# Patient Record
Sex: FEMALE
[De-identification: names, ages, dates, MRNs, and addresses within clinical notes are randomized per-mention and may not be internally consistent; named-entity substitution may affect disease eponyms.]

---

## 2013-12-01 VITALS — DIASTOLIC BLOOD PRESSURE: 89 MMHG | SYSTOLIC BLOOD PRESSURE: 138 MMHG

## 2018-06-03 ENCOUNTER — HOSPITAL ENCOUNTER (EMERGENCY)
Dept: HOSPITAL 7 - FB.ED | Age: 33
Discharge: HOME | End: 2018-06-03
Payer: COMMERCIAL

## 2018-06-03 VITALS — DIASTOLIC BLOOD PRESSURE: 68 MMHG | SYSTOLIC BLOOD PRESSURE: 111 MMHG

## 2018-06-03 DIAGNOSIS — R10.30: Primary | ICD-10-CM

## 2018-06-03 DIAGNOSIS — Z90.49: ICD-10-CM

## 2018-06-03 DIAGNOSIS — E11.9: ICD-10-CM

## 2018-06-04 NOTE — ER
DATE SEEN:  2018

 

REASON FOR VISIT:  Abdominal pain.

 

HISTORY OF PRESENT ILLNESS:  This is a 32-year-old female complaining of

abdominal pain.  Her pain is moderate, started tonight in the lower abdominal

quadrants.  There is no association with any vaginal bleeding, urinary symptoms,

or GI symptoms.

 

PAST SURGICAL HISTORY:

1. Appendectomy.

2. Gallbladder surgery.

3.  x2.

 

ALLERGIES:  Reviewed.

 

MEDICATIONS:  Reviewed.

 

PAST MEDICAL HISTORY:  Type 2 diabetes.

 

PHYSICAL EXAMINATION:  VITAL SIGNS:  Blood pressure is normal, pulse is 87, and

she is afebrile.  ABDOMEN:  Soft with tenderness in the lower quadrants, but no

rebound and no masses.  CHEST AND HEART:  Normal to auscultation.  PSYCHIATRIC:

Mental status is alert.

 

LABORATORY DATA:  Normal electrolytes and UA.  Negative pregnancy test.  White

cell count of 16.4.

 

DIAGNOSTIC STUDIES:  Pelvic ultrasound was unremarkable.

 

IMPRESSION:  Nonspecific abdominal pain.

 

PLAN:  Morphine 10 mg IM x1 dose.  Symptoms improved.  The patient was

discharged home.

 

Job#: [id number]

DD: [id number]

DT: 2018 0326 TN/STEPHAN

## 2018-06-19 NOTE — US
INDICATION:  Left lower pelvic pain.



PELVIC ULTRASOUND, NON-OB, LIMITED:  Multiple ultrasonic images were obtained 
with transabdominal probe, 06/03/2018, and revealed poor visualization of the 
uterus and adnexa due to lack of complete filling of the urinary bladder.  
Endovaginal probe ultrasound will be necessary for further evaluation, 
therefore.  



No gross abnormalities were seen - no definite adnexal mass lesion or free 
fluid collection was identified.  



INDICATION:  Left lower pelvic pain/need better visualization of the uterus and 
endometrial cavity, as well as ovaries, than was possible with the 
transabdominal probe.  



TRANSVAGINAL PELVIC ULTRASOUND:  Utilizing transvaginal probe, multiple 
ultrasonic images were obtained and revealed the uterus to measure 7.2 x 3.3 x 
5 cm with a 6.0 mm endometrial cavity measurement. 



The right ovary had a volume of 8.8 mL, measuring 3 x 3.3 x 1.7 cm.  



The left ovary volume was 2.6 mL with measurements of 1.4 x 2.2 x 1.6 cm.  



The endometrial cavity echo appeared essentially normal.  Likely on the basis 
of menstrual cycle, there is some relatively low echogenicity in the central 
portion.  Tiny nabothian cyst is suggested at the cervix.  



No mass lesions were identified in the myometrium or endometrium.  No free 
fluid collections or adnexal mass lesions were identified.  



There are multiple follicles at the right ovary, which was otherwise 
unremarkable.  The left ovary showed only very minimal follicles present.  This 
accounts for the difference in volume of the ovaries.



IMPRESSION:  Essentially normal pelvic ultrasound with transvaginal probe.  
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

MTDD no

## 2018-09-22 ENCOUNTER — HOSPITAL ENCOUNTER (EMERGENCY)
Dept: HOSPITAL 7 - FB.ED | Age: 33
Discharge: HOME | End: 2018-09-22
Payer: COMMERCIAL

## 2018-09-22 VITALS — DIASTOLIC BLOOD PRESSURE: 79 MMHG | SYSTOLIC BLOOD PRESSURE: 137 MMHG

## 2018-09-22 DIAGNOSIS — S29.012A: Primary | ICD-10-CM

## 2018-09-22 DIAGNOSIS — Z91.040: ICD-10-CM

## 2018-09-22 DIAGNOSIS — Z88.0: ICD-10-CM
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
## 2018-09-22 DIAGNOSIS — E11.9: ICD-10-CM

## 2018-09-22 PROCEDURE — 72072 X-RAY EXAM THORAC SPINE 3VWS: CPT

## 2018-09-22 PROCEDURE — 96372 THER/PROPH/DIAG INJ SC/IM: CPT

## 2018-09-22 PROCEDURE — 99283 EMERGENCY DEPT VISIT LOW MDM: CPT

## 2018-09-22 NOTE — EDM.PDOC
ED HPI GENERAL MEDICAL PROBLEM





- General


Chief Complaint: Back Pain or Injury


Stated Complaint: back pain


Time Seen by Provider: 18 07:57


Source of Information: Reports: Patient


History Limitations: Reports: No Limitations





- History of Present Illness


INITIAL COMMENTS - FREE TEXT/NARRATIVE: 





Felt a pop @mid back yesterday at work while lifting 5 lb tubes, developed mid 

back pain that radiates to hips and up back, not relieved by Ibuprofen or 

Tylenol.


Onset Date: 18


Onset Time: 00:00


Location: Reports: Back


Quality: Reports: Dull


Severity: Moderate


Worsens with: Reports: Movement


Treatments PTA: Reports: Acetaminophen, NSAIDS


  ** mid,lower back


Pain Score (Numeric/FACES): 10





- Related Data


 Allergies











Allergy/AdvReac Type Severity Reaction Status Date / Time


 


latex Allergy  Rash Verified 18 07:50


 


naproxen [From Naprosyn] Allergy  Nausea and Verified 18 07:50





   Vomiting  


 


Penicillins Allergy  Rash Verified 18 07:50











Home Meds: 


 Home Meds





Ibuprofen [Motrin] 600 mg PO Q6HR PRN 13 [History]


Topiramate [Topamax] 50 mg PO BID 14 [History]


Pregabalin [Lyrica] 150 mg PO BID 14 [History]


Lithium Carbonate [Eskalith] 300 mg PO BID 16 [History]


traZODone 50 mg PO DAILY 16 [History]


Acetaminophen/HYDROcodone [Norco 325-5 MG] 1 - 2 tab PO Q6H PRN #20 tab  [Rx]


Lurasidone HCl [Latuda] 1 tab PO BEDTIME 18 [History]


sitaGLIPtin Phos/Metformin HCl [Janumet 50-1,000 MG] 1 tab PO BID 18 [

History]











Past Medical History


HEENT History: Reports: Impaired Vision


Respiratory History: Reports: Bronchitis, Recurrent


Gastrointestinal History: Reports: Cholelithiasis


OB/GYN History: Reports: Pregnancy


Musculoskeletal History: Reports: Fibromyalgia


Psychiatric History: Reports: Anxiety, Depression, Panic Attack


Endocrine/Metabolic History: Reports: Diabetes, Type II





- Past Surgical History


GI Surgical History: Reports: Appendectomy, Cholecystectomy, Other (See Below)


Other GI Surgeries/Procedures: exp lap for ruptured cyst


Female  Surgical History: Reports:  Section





Social & Family History





- Family History


Family Medical History: Noncontributory





- Tobacco Use


Smoking Status *Q: Current Every Day Smoker


Tobacco Use Within Last Twelve Months: Cigarettes





- Caffeine Use


Caffeine Use: Reports: Coffee, Soda





- Living Situation & Occupation


Living situation: Reports: 


Occupation: Unemployed





ED ROS GENERAL





- Review of Systems


Review Of Systems: ROS reveals no pertinent complaints other than HPI.





ED EXAM, UPPER BACK/NECK PAIN





- Physical Exam


Exam: See Below


Exam Limited By: No Limitations


General Appearance: Alert, WD/WN, No Apparent Distress


Ears Exam: Normal External Exam


Nose Exam: Normal Inspection


Throat/Mouth Exam: No Airway Compromise


Head Exam: Atraumatic, Normocephalic


Neck Exam: Full Range of Motion


Cardiovascular/Respiratory: Regular Rate, Rhythm, No M/R/G


GI/Abdominal: Non-Tender


Back Exam: Muscle Spasm, Vertebral Tenderness (mid back)


Extremities: Normal Range of Motion


Neurologic: No Motor/Sensory Deficits, Normal Mood/Affect


Psychiatric: Normal Affect, Normal Mood


Skin Exam: Normal Color, Warm/Dry





Course





- Vital Signs


Last Recorded V/S: 


 Last Vital Signs











Temp  36.9 C   18 07:45


 


Pulse  90   18 07:45


 


Resp  17   18 07:45


 


BP  136/65   18 07:45


 


Pulse Ox  100   18 07:45














- Orders/Labs/Meds


Orders: 


 Active Orders 24 hr











 Category Date Time Status


 


 Thoracic Spine 3V [CR] Stat Exams  18 07:56 Ordered











Meds: 


Medications














Discontinued Medications














Generic Name Dose Route Start Last Admin





  Trade Name Maria Elena  PRN Reason Stop Dose Admin


 


Hydromorphone HCl  1 mg  18 07:56  18 08:38





  Dilaudid  IM  18 07:57  1 mg





  ONETIME ONE   Administration





     





     





     





     














- Radiology Interpretation


Free Text/Narrative:: 





Thoracic spine XR: NAD





- Re-Assessments/Exams


Free Text/Narrative Re-Assessment/Exam: 





18 08:53


Symptoms improved after Dilaudid 1mg IM





Departure





- Departure


Time of Disposition: 08:53


Disposition: Home, Self-Care 01


Condition: Good


Clinical Impression: 


Strain of mid-back


Qualifiers:


 Encounter type: initial encounter Qualified Code(s): S29.012A - Strain of 

muscle and tendon of back wall of thorax, initial encounter








- Discharge Information


*PRESCRIPTION DRUG MONITORING PROGRAM REVIEWED*: Yes


*COPY OF PRESCRIPTION DRUG MONITORING REPORT IN PATIENT BERNICE: Not Applicable


Prescriptions: 


Acetaminophen/HYDROcodone [Norco 325-5 MG] 1 - 2 tab PO Q6H PRN #20 tab


 PRN Reason: Pain


Instructions:  Back Injury Prevention, Easy-to-Read


Referrals: 


Dheeraj Brannon MD [Primary Care Provider] - 


Forms:  ED Department Discharge, ED Return to Work/School Form


Additional Instructions: 


Rest. Fill prescription for Norco and take as directed. Avoid lifting greater 

than 10 lbs x 1 week. Follow up with your primary physician in 2-3 days. Return 

to the ER as needed.





- My Orders


Last 24 Hours: 


My Active Orders





18 07:56


Thoracic Spine 3V [CR] Stat 














- Assessment/Plan


Last 24 Hours: 


My Active Orders





18 07:56


Thoracic Spine 3V [CR] Stat

## 2018-09-25 NOTE — CR
INDICATION:  Injury.



THORACIC SPINE:  Six images of the thoracic spine were obtained in frontal and 
lateral projections and revealed normal appearing bone density, vertebral body 
and disk heights to be well-maintained, pedicles to appear intact.  



Minimal degenerative hypertrophic change is noted anteriorly off mid thoracic 
vertebral bodies.  



If occult fracture site is suspected clinically, nuclear bone imaging, CT, or 
MRI may be helpful.



A very minimal dextroconvex scoliosis is suggested at the thoracolumbar spine.



IMPRESSION:  No acute fracture or dislocation identified.  

MTDD

## 2019-02-28 ENCOUNTER — HOSPITAL ENCOUNTER (EMERGENCY)
Dept: HOSPITAL 7 - FB.ED | Age: 34
LOS: 1 days | Discharge: HOME | End: 2019-03-01
Payer: COMMERCIAL

## 2019-02-28 DIAGNOSIS — F41.9: ICD-10-CM

## 2019-02-28 DIAGNOSIS — E11.40: ICD-10-CM

## 2019-02-28 DIAGNOSIS — M94.0: Primary | ICD-10-CM

## 2019-02-28 DIAGNOSIS — F31.9: ICD-10-CM

## 2019-02-28 DIAGNOSIS — Z91.040: ICD-10-CM

## 2019-02-28 DIAGNOSIS — Z79.899: ICD-10-CM

## 2019-02-28 DIAGNOSIS — Z79.4: ICD-10-CM

## 2019-02-28 DIAGNOSIS — F17.210: ICD-10-CM

## 2019-02-28 PROCEDURE — 99284 EMERGENCY DEPT VISIT MOD MDM: CPT

## 2019-02-28 PROCEDURE — 93005 ELECTROCARDIOGRAM TRACING: CPT

## 2019-02-28 PROCEDURE — 87880 STREP A ASSAY W/OPTIC: CPT

## 2019-02-28 PROCEDURE — 36415 COLL VENOUS BLD VENIPUNCTURE: CPT

## 2019-02-28 PROCEDURE — 87081 CULTURE SCREEN ONLY: CPT

## 2019-02-28 PROCEDURE — 85025 COMPLETE CBC W/AUTO DIFF WBC: CPT

## 2019-02-28 PROCEDURE — 86140 C-REACTIVE PROTEIN: CPT

## 2019-02-28 PROCEDURE — 96372 THER/PROPH/DIAG INJ SC/IM: CPT

## 2019-02-28 PROCEDURE — 87804 INFLUENZA ASSAY W/OPTIC: CPT

## 2019-02-28 PROCEDURE — 80053 COMPREHEN METABOLIC PANEL: CPT

## 2019-02-28 PROCEDURE — 84484 ASSAY OF TROPONIN QUANT: CPT

## 2019-03-01 VITALS — SYSTOLIC BLOOD PRESSURE: 99 MMHG | DIASTOLIC BLOOD PRESSURE: 43 MMHG

## 2019-03-01 NOTE — EDM.PDOC
ED HPI GENERAL MEDICAL PROBLEM





- General


Chief Complaint: Chest Pain


Stated Complaint: CHEST PAIN SOB


Time Seen by Provider: 19 23:30


Source of Information: Reports: Patient


History Limitations: Reports: No Limitations





- History of Present Illness


INITIAL COMMENTS - FREE TEXT/NARRATIVE: 





c/o cp x 1.5d





lives with  and 3 children who are not ill now, 1 child with strep 1w ago





had cough x 1w, nonproductive, no fever, not had flu vax





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





has had pain in upper mid chest to pressure, ibuprofen once earlier today helped

, not taken other meds





has DM





smokes 1 ppd





no previous heart or lung problems





constant, nothing helps except ibuprofen, nothing makes it worse





needs a note for work


Treatments PTA: Reports: NSAIDS


  ** Midsternal chest radiating into upper back


Pain Score (Numeric/FACES): 7





- Related Data


 Allergies











Allergy/AdvReac Type Severity Reaction Status Date / Time


 


latex Allergy  Rash Verified 19 23:24


 


naproxen [From Naprosyn] Allergy  Nausea and Verified 19 23:24





   Vomiting  


 


Penicillins Allergy  Rash Verified 19 23:24











Home Meds: 


 Home Meds





Ibuprofen [Motrin] 600 mg PO Q6HR PRN 13 [History]


Topiramate [Topamax] 50 mg PO BID 14 [History]


Pregabalin [Lyrica] 300 mg PO BID 14 [History]


Lithium Carbonate [Eskalith] 900 mg PO DAILY 16 [History]


traZODone 150 mg PO DAILY 16 [History]


Lurasidone HCl [Latuda] 120 mg PO BEDTIME 18 [History]


sitaGLIPtin Phos/Metformin HCl [Janumet 50-1,000 MG] 1 tab PO BID 18 [

History]


Eszopiclone 3 mg BEDTIME 19 [History]


Insulin Aspart [NovoLOG] 10 units TID 19 [History]


Insulin Degludec [Tresiba Flextouch U-100] 50 unit SQ DAILY 19 [History]


Lithium Carbonate 600 mg BEDTIME 19 [History]











Past Medical History


HEENT History: Reports: Impaired Vision


Other HEENT History: no dentures


Respiratory History: Reports: Bronchitis, Recurrent


Gastrointestinal History: Reports: Cholelithiasis


Genitourinary History: Reports: None


OB/GYN History: Reports: Polycystic Ovaries, Pregnancy


Other OB/GYN History: 


Musculoskeletal History: Reports: Fibromyalgia


Neurological History: Reports: Migraines, Neuropathy, Diabetic


Psychiatric History: Reports: Anxiety, Bipolar, Depression, Panic Attack, Psych 

Hospitalization(s), Suicide Attempt


Endocrine/Metabolic History: Reports: Diabetes, Type II, Obesity/BMI 30+





- Past Surgical History


HEENT Surgical History: Reports: Adenoidectomy, Oral Surgery, Tonsillectomy


GI Surgical History: Reports: Appendectomy, Cholecystectomy, Other (See Below)


Other GI Surgeries/Procedures: exp lap for ruptured cyst


Female  Surgical History: Reports:  Section


Other Female  Surgeries/Procedures: CS x 1


Neurological Surgical History: Reports: None


Musculoskeletal Surgical History: Reports: None





Social & Family History





- Family History


Family Medical History: Noncontributory





- Tobacco Use


Smoking Status *Q: Current Every Day Smoker


Years of Tobacco use: 20


Packs/Tins Daily: 0.5





- Caffeine Use


Caffeine Use: Reports: Coffee, Energy Drinks, Soda





- Recreational Drug Use


Recreational Drug Use: No





- Living Situation & Occupation


Living situation: Reports: 


Occupation: Unemployed





ED ROS GENERAL





- Review of Systems


Review Of Systems: See Below


Constitutional: Reports: No Symptoms


HEENT: Reports: No Symptoms


Respiratory: Reports: No Symptoms


Cardiovascular: Reports: Chest Pain


Endocrine: Reports: No Symptoms


GI/Abdominal: Reports: No Symptoms


: Reports: No Symptoms


Musculoskeletal: Reports: No Symptoms


Skin: Reports: No Symptoms


Neurological: Reports: No Symptoms


Psychiatric: Reports: No Symptoms


Hematologic/Lymphatic: Reports: No Symptoms


Immunologic: Reports: No Symptoms





ED EXAM, GENERAL





- Physical Exam


Exam: See Below


Exam Limited By: No Limitations


General Appearance: Alert, WD/WN, No Apparent Distress


Nose: Normal Inspection, Normal Mucosa, No Blood


Throat/Mouth: Normal Inspection, Normal Lips, Normal Teeth, Normal Gums, Normal 

Oropharynx, Normal Voice, No Airway Compromise


Head: Atraumatic, Normocephalic


Neck: Normal Inspection, Supple, Non-Tender, Full Range of Motion.  No: 

Lymphadenopathy (R), Lymphadenopathy (L)


Respiratory/Chest: No Respiratory Distress, Lungs Clear, Normal Breath Sounds, 

No Accessory Muscle Use, Other (1-2+ tender to palaption of upper sternum and 

adjacent ribs, lungs clear in all fields)


Cardiovascular: Regular Rate, Rhythm, No Edema, No Gallop, No Murmur, No Rub


GI/Abdominal: Soft, Non-Tender, No Distention


Back Exam: Normal Inspection, Full Range of Motion, NT


Extremities: Normal Inspection, Normal Range of Motion, Non-Tender, No Pedal 

Edema


Neurological: Alert, Oriented, CN II-XII Intact, Normal Cognition, No Motor/

Sensory Deficits


Psychiatric: Normal Affect, Normal Mood


Skin Exam: Warm, Dry, Intact, Normal Color, No Rash


Lymphatic: No Adenopathy





Course





- Vital Signs


Last Recorded V/S: 


 Last Vital Signs











Temp  36.9 C   19 23:24


 


Pulse  90   19 23:24


 


Resp  18   19 23:24


 


BP  118/62   19 23:24


 


Pulse Ox  98   19 23:24














- Orders/Labs/Meds


Orders: 


 Active Orders 24 hr











 Category Date Time Status


 


 EKG Documentation Completion [RC] ASDIRECTED Care  19 23:42 Active


 


 CULTURE STREP A CONFIRMATION [RM] Stat Lab  19 00:10 Results


 


 STREP SCRN A RAPID W CULT CONF [RM] Stat Lab  19 23:59 Ordered


 


 EKG 12 Lead [EK] Routine Ther  19 23:42 Ordered











Labs: 


 Laboratory Tests











  19 Range/Units





  00:20 00:20 00:20 


 


WBC  16.4 H    (4.5-12.0)  X10-3/uL


 


RBC  4.92    (3.23-5.20)  x10(6)uL


 


Hgb  14.3    (11.5-15.5)  g/dL


 


Hct  42.6    (30.0-51.3)  %


 


MCV  86.7    (80-96)  fL


 


MCH  29.0    (27.7-33.6)  pg


 


MCHC  33.5    (32.2-35.4)  g/dL


 


RDW  13.2    (11.5-15.5)  %


 


Plt Count  228    (125-369)  X10(3)uL


 


MPV  9.3    (7.4-10.4)  fL


 


Neut % (Auto)  67.0    (46-82)  %


 


Lymph % (Auto)  21.4    (13-37)  %


 


Mono % (Auto)  6.0    (4-12)  %


 


Eos % (Auto)  5    (1.0-5.0)  %


 


Baso % (Auto)  0    (0-2)  %


 


Neut # (Auto)  10.9 H    (1.6-8.3)  #


 


Lymph # (Auto)  3.5    (0.6-5.0)  #


 


Mono # (Auto)  1.0    (0.0-1.3)  #


 


Eos # (Auto)  0.9 H    (0.0-0.8)  #


 


Baso # (Auto)  0.1    (0.0-0.2)  #


 


Sodium   141   (135-145)  mmol/L


 


Potassium   3.5   (3.5-5.3)  mmol/L


 


Chloride   103   (100-110)  mmol/L


 


Carbon Dioxide   27   (21-32)  mmol/L


 


BUN   10   (7-18)  mg/dL


 


Creatinine   0.8   (0.55-1.02)  mg/dL


 


Est Cr Clr Drug Dosing   99.95   mL/min


 


Estimated GFR (MDRD)   > 60   (>60)  


 


BUN/Creatinine Ratio   12.5   (9-20)  


 


Glucose   158 H   ()  mg/dL


 


Calcium   9.5   (8.6-10.2)  mg/dL


 


Total Bilirubin   0.4   (0.1-1.3)  mg/dL


 


AST   22   (5-25)  IU/L


 


ALT   32  D   (12-36)  U/L


 


Alkaline Phosphatase   130 H   ()  IU/L


 


Troponin I    < 0.017 L  (<0.017-0.056)  ng/mL


 


C-Reactive Protein    1.8 H  (0.5-0.9)  mg/dL


 


Total Protein   7.2   (6.0-8.0)  g/dL


 


Albumin   3.4 L   (3.5-5.2)  g/dL


 


Globulin   3.8   g/dL


 


Albumin/Globulin Ratio   0.9   











Meds: 


Medications














Discontinued Medications














Generic Name Dose Route Start Last Admin





  Trade Name Freq  PRN Reason Stop Dose Admin


 


Ketorolac Tromethamine  60 mg  19 23:58  19 00:10





  Toradol  IM  19 23:59  60 mg





  ONETIME ONE   Administration





     





     





     





     














- Re-Assessments/Exams


Free Text/Narrative Re-Assessment/Exam: 





19 01:07


inc'd CRP 1.8 c/w viral bronchitis as cause of cough





strep and flu are neg





feels better after Toradol IM, agrees to use ibuprofen and APAP at home





Departure





- Departure


Time of Disposition: 01:08


Disposition: Home, Self-Care 01


Condition: Good


Clinical Impression: 


 Costochondritis





Instructions:  Costochondritis


Referrals: 


Dheeraj Brannon MD [Primary Care Provider] - 


Forms:  ED Department Discharge, ED Return to Work/School Form


Additional Instructions: 


For pain and inflammation, take acetaminophen 500 mg 2 tabs and ibuprofen 200 

mg 4 tabs 3 times a day for 5 days, longer if needed.





Use heat to chest for 10 minutes 4 times a day.





No work.





See your doctor in 3 days for further recommendations.





Return to ED if feeling worse.





- My Orders


Last 24 Hours: 


My Active Orders





19 23:42


EKG Documentation Completion [RC] ASDIRECTED 


EKG 12 Lead [EK] Routine 





19 23:59


STREP SCRN A RAPID W CULT CONF [RM] Stat 





19 00:10


CULTURE STREP A CONFIRMATION [RM] Stat 














- Assessment/Plan


Last 24 Hours: 


My Active Orders





19 23:42


EKG Documentation Completion [RC] ASDIRECTED 


EKG 12 Lead [EK] Routine 





19 23:59


STREP SCRN A RAPID W CULT CONF [RM] Stat 





19 00:10


CULTURE STREP A CONFIRMATION [RM] Stat

## 2019-03-23 ENCOUNTER — HOSPITAL ENCOUNTER (EMERGENCY)
Dept: HOSPITAL 7 - FB.ED | Age: 34
LOS: 1 days | Discharge: HOME | End: 2019-03-24
Payer: COMMERCIAL

## 2019-03-23 VITALS — DIASTOLIC BLOOD PRESSURE: 75 MMHG | SYSTOLIC BLOOD PRESSURE: 123 MMHG

## 2019-03-23 DIAGNOSIS — M23.92: Primary | ICD-10-CM

## 2019-03-23 DIAGNOSIS — Z79.899: ICD-10-CM

## 2019-03-23 DIAGNOSIS — Z91.040: ICD-10-CM

## 2019-03-23 DIAGNOSIS — E11.9: ICD-10-CM

## 2019-03-23 DIAGNOSIS — Z79.4: ICD-10-CM

## 2019-03-23 DIAGNOSIS — Z88.0: ICD-10-CM

## 2019-03-23 DIAGNOSIS — Z88.5: ICD-10-CM

## 2019-03-23 PROCEDURE — 99283 EMERGENCY DEPT VISIT LOW MDM: CPT

## 2019-03-23 PROCEDURE — 96372 THER/PROPH/DIAG INJ SC/IM: CPT

## 2019-03-23 NOTE — ER
DATE SEEN:  03/23/2019

 

CHIEF COMPLAINT:  Left knee pain.

 

HISTORY OF PRESENT ILLNESS:  This is a 33-year-old female with left knee pain

for a few hours.  She left work because the pain on the left knee became

unbearable.  There was no specific injury and the pain started before she went

to work.  She had trouble with both knees before, especially on the right one.

She also has a history of back pain, type 2 diabetes and fibromyalgia.  She has

tried Tylenol at home with no relief.

 

REVIEW OF SYSTEMS:  No fever or chills.  She complains some swelling and the

knee pain is most localized in the back.  Denies any weakness.

 

ALLERGIES:  Please see the electronic record.

 

PHYSICAL EXAMINATION:  VITAL SIGNS:  Her blood pressure and temperature within

normal limits.  EXTREMITIES:  Left knee, no obvious effusion noted.  No trauma.

There is tenderness anteriorly and some in the popliteal fossa.  Range of motion

is unlimited.  Gait and station are normal.

 

IMPRESSION:  Knee pain, acute internal derangement.

 

PLAN:  Morphine 10 mg IM and Vistaril 50 mg IM.  I recommended rest, ice and

elevation and I asked her to return to work, but not to bear weight on it and

she should follow up on Monday in the clinic.

 

TIME OF DISCHARGE:  1130.

 

Job#: [id number]

DD: 03/23/2019 [id number]

DT: 03/23/2019 2342 TN/STEPHAN

## 2019-10-12 ENCOUNTER — HOSPITAL ENCOUNTER (INPATIENT)
Dept: HOSPITAL 7 - FB.MS | Age: 34
LOS: 5 days | Discharge: HOME | DRG: 872 | End: 2019-10-17
Attending: FAMILY MEDICINE | Admitting: FAMILY MEDICINE
Payer: COMMERCIAL

## 2019-10-12 DIAGNOSIS — L02.31: ICD-10-CM

## 2019-10-12 DIAGNOSIS — F41.0: ICD-10-CM

## 2019-10-12 DIAGNOSIS — E11.42: ICD-10-CM

## 2019-10-12 DIAGNOSIS — F31.9: ICD-10-CM

## 2019-10-12 DIAGNOSIS — Z90.89: ICD-10-CM

## 2019-10-12 DIAGNOSIS — Z90.49: ICD-10-CM

## 2019-10-12 DIAGNOSIS — Z79.4: ICD-10-CM

## 2019-10-12 DIAGNOSIS — E87.6: ICD-10-CM

## 2019-10-12 DIAGNOSIS — B95.61: ICD-10-CM

## 2019-10-12 DIAGNOSIS — K61.1: ICD-10-CM

## 2019-10-12 DIAGNOSIS — G43.909: ICD-10-CM

## 2019-10-12 DIAGNOSIS — Z91.040: ICD-10-CM

## 2019-10-12 DIAGNOSIS — Z88.0: ICD-10-CM

## 2019-10-12 DIAGNOSIS — M79.7: ICD-10-CM

## 2019-10-12 DIAGNOSIS — Z88.8: ICD-10-CM

## 2019-10-12 DIAGNOSIS — L03.317: ICD-10-CM

## 2019-10-12 DIAGNOSIS — A41.9: Primary | ICD-10-CM

## 2019-10-12 DIAGNOSIS — Z79.899: ICD-10-CM

## 2019-10-12 DIAGNOSIS — E66.9: ICD-10-CM

## 2019-10-12 PROCEDURE — A9579 GAD-BASE MR CONTRAST NOS,1ML: HCPCS

## 2019-10-12 PROCEDURE — G0378 HOSPITAL OBSERVATION PER HR: HCPCS

## 2019-10-12 RX ADMIN — KETOROLAC TROMETHAMINE PRN MG: 30 INJECTION, SOLUTION INTRAMUSCULAR at 22:26

## 2019-10-12 RX ADMIN — Medication PRN ML: at 18:22

## 2019-10-12 RX ADMIN — METRONIDAZOLE SCH MLS/HR: 500 SOLUTION INTRAVENOUS at 19:18

## 2019-10-12 RX ADMIN — Medication PRN ML: at 22:28

## 2019-10-12 RX ADMIN — METRONIDAZOLE SCH: 500 SOLUTION INTRAVENOUS at 22:16

## 2019-10-12 RX ADMIN — Medication PRN ML: at 22:27

## 2019-10-12 NOTE — PCM.HP.2
H&P History of Present Illness





- General


Date of Service: 10/12/19


Admit Problem/Dx: 


 Admission Diagnosis/Problem





Admission Diagnosis/Problem      Cellulitis of buttock








Source of Information: Patient, Family


History Limitations: Reports: No Limitations





- History of Present Illness


Initial Comments - Free Text/Narative: 


Patient noted pain with sitting and green foul drainage from left buttock 

starting on Thursday, she refused to come to the doctor on Thursday when her 

 advised her to. She has been having fevers, chills, nausea, vomiting, 

but no diarrhea. Last emesis was last night. She has had increased frequency 

but no dysuria or hematuria. She has not done any sitz baths or applied any 

topical antibiotics. Rates pain 10/10. Was seen in Walk-In clinic today and 

when provider examined, called me for admission. She was afebrile in the clinic 

but tachycardiac. When she arrived to floor she was febrile, 101. She has been 

having cold symptoms for the past week but has not taken any treatments. She is 

Diabetic that has Tresiba, Novolog and Janumet but has not been taking her 

medications. She knows that her uncontrolled sugars put her at risk of 

infections. She also report being achy all over. No dizziness or 

lightheadedness. Also was treated for fungal infection on her feet with oral 

and topical medication(not sure of the names), oral for 4 weeks but did not 

change the rash. 





- Related Data


Allergies/Adverse Reactions: 


 Allergies











Allergy/AdvReac Type Severity Reaction Status Date / Time


 


latex Allergy  Rash Verified 19 23:49


 


naproxen [From Naprosyn] Allergy  Nausea and Verified 19 23:49





   Vomiting  


 


Penicillins Allergy  Rash Verified 19 23:49











Home Medications: 


 Home Meds





Ibuprofen [Motrin] 600 mg PO Q6HR PRN 13 [History]


Topiramate [Topamax] 50 mg PO BID 14 [History]


Pregabalin [Lyrica] 300 mg PO BID 14 [History]


Lithium Carbonate [Eskalith] 900 mg PO DAILY 16 [History]


traZODone 150 mg PO DAILY 16 [History]


Lurasidone HCl [Latuda] 120 mg PO BEDTIME 18 [History]


sitaGLIPtin Phos/Metformin HCl [Janumet 50-1,000 MG] 1 tab PO BID 18 [

History]


Eszopiclone 3 mg BEDTIME 19 [History]


Insulin Aspart [NovoLOG] 10 units TID 19 [History]


Insulin Degludec [Tresiba Flextouch U-100] 50 unit SQ DAILY 19 [History]


Lithium Carbonate 600 mg BEDTIME 19 [History]











Past Medical History


HEENT History: Reports: Impaired Vision


Other HEENT History: no dentures


Respiratory History: Reports: Bronchitis, Recurrent


Gastrointestinal History: Reports: Cholelithiasis


Genitourinary History: Reports: None


OB/GYN History: Reports: Polycystic Ovaries, Pregnancy


Other OB/BYN History: 


Musculoskeletal History: Reports: Fibromyalgia


Neurological History: Reports: Migraines, Neuropathy, Diabetic


Psychiatric History: Reports: Anxiety, Bipolar, Depression, Panic Attack, Psych 

Hospitalization(s), Suicide Attempt


Endocrine/Metabolic History: Reports: Diabetes, Type II, Obesity/BMI 30+





- Past Surgical History


HEENT Surgical History: Reports: Adenoidectomy, Oral Surgery, Tonsillectomy


GI Surgical History: Reports: Appendectomy, Cholecystectomy, Other (See Below)


Other GI Surgeries/Procedures: exp lap for ruptured cyst


Female  Surgical History: Reports:  Section


Other Female  Surgeries/Procedures: CS x 1


Neurological Surgical History: Reports: None


Musculoskeletal Surgical History: Reports: None





Social & Family History





- Family History


Family Medical History: Noncontributory





- Caffeine Use


Caffeine Use: Reports: Coffee, Soda





- Living Situation & Occupation


Living situation: Reports: 


Occupation: Unemployed





H&P Review of Systems





- Review of Systems:


Review Of Systems: See Below


General: Reports: Fever, Chills, Weakness, Fatigue


HEENT: Reports: Rhinitis, Sinus Congestion.  Denies: Ear Pain, Eye Pain, 

Headaches, Sore Throat


Pulmonary: Reports: Cough.  Denies: Shortness of Breath


Cardiovascular: Reports: No Symptoms


Gastrointestinal: Reports: Abdominal Pain, Nausea, Vomiting.  Denies: 

Constipation, Diarrhea


Genitourinary: Reports: Frequency.  Denies: Dysuria, Hematuria


Musculoskeletal: Reports: Muscle Pain


Skin: Reports: Rash, Wound


Neurological: Reports: No Symptoms


Hematologic/Lymphatic: Reports: No Symptoms





Exam





- Exam


Exam: See Below





- Exam


General: Alert, Oriented, Cooperative


HEENT: PERRLA, Hearing Intact, Mucosa Moist & Pink, Nares Patent, Normal Nasal 

Septum, Posterior Pharynx Clear, Conjunctiva Clear, EOMI, EACs Clear, TMs Clear


Neck: Supple, Trachea Midline.  No: Lymphadenopathy


Lungs: Clear to Auscultation, Normal Respiratory Effort


Cardiovascular: Regular Rate, Tachycardia


GI/Abdominal Exam: Normal Bowel Sounds, Soft, No Distention, Guarding, Tender (

BLQ).  No: Rigid, Rebound


 (Female) Exam: Normal External Exam, Normal Speculum Exam, Normal Bimanual 

Exam


Rectal (Female) Exam: Other (Foul green drainage(culture collected), indurated, 

tender erythematous warm area 7 cm, surrounding erythema 18 cm, edges marked 

with skin marker)


Extremities: No Pedal Edema


Skin: Rash (left foot, yellow, fissured patches on ball, instep, and lateral 

side of her foot.)


Neuro Extensive - Mental Status: Alert, Oriented x3





- Problem List


(1) Cellulitis of left buttock


SNOMED Code(s): 27893203


   ICD Code: L03.317 - CELLULITIS OF BUTTOCK   Status: Acute   Current Visit: 

Yes   





(2) Nausea & vomiting


SNOMED Code(s): 13888956


   ICD Code: R11.2 - NAUSEA WITH VOMITING, UNSPECIFIED   Status: Acute   

Current Visit: Yes   





(3) Fever


SNOMED Code(s): 224511816


   ICD Code: R50.9 - FEVER, UNSPECIFIED   Status: Acute   Current Visit: Yes   





(4) Fibromyalgia


SNOMED Code(s): 790658902


   ICD Code: M79.7 - FIBROMYALGIA   Status: Acute   Current Visit: Yes   





(5) Diabetes mellitus with insulin therapy


SNOMED Code(s): 359185635


   ICD Code: E11.9 - TYPE 2 DIABETES MELLITUS WITHOUT COMPLICATIONS; Z79.4 - 

LONG TERM (CURRENT) USE OF INSULIN   Status: Acute   Current Visit: Yes   


Problem List Initiated/Reviewed/Updated: Yes


Orders Last 24hrs: 


 Active Orders 24 hr











 Category Date Time Status


 


 Patient Status [ADT] Routine ADT  10/12/19 16:32 Ordered


 


 Ambulate [RC] PER UNIT ROUTINE Care  10/12/19 16:34 Ordered


 


 Antiembolic Devices [RC] .Routine Care  10/12/19 16:32 Ordered


 


 Blood Glucose Check, Bedside [RC] WITHMEALSANDBED Care  10/12/19 16:32 Ordered


 


 May Shower [RC] ASDIRECTED Care  10/12/19 16:32 Ordered


 


 Oxygen Therapy [RC] PRN Care  10/12/19 16:32 Ordered


 


 Peripheral IV Care [RC] .AS DIRECTED Care  10/12/19 16:32 Ordered


 


 Up ad Anali [RC] ASDIRECTED Care  10/12/19 16:32 Ordered


 


 VTE/DVT Education [RC] Per Unit Routine Care  10/12/19 16:32 Ordered


 


 Vital Signs [RC] Q4H Care  10/12/19 16:32 Ordered


 


 Consistent Carbohydrate Diet [DIET] Diet  10/12/19 Dinner Ordered


 


 CBC WITH AUTO DIFF [HEME] Urgent Lab  10/12/19 16:32 Ordered


 


 COMPREHENSIVE METABOLIC PN,CMP [CHEM] Urgent Lab  10/12/19 16:32 Ordered


 


 CRP [C-REACTIVE PROTEIN] [CHEM] Urgent Lab  10/12/19 16:44 Ordered


 


 CULTURE BLOOD [BC] Urgent Lab  10/12/19 16:40 Ordered


 


 CULTURE BLOOD [BC] Urgent Lab  10/12/19 16:40 Ordered


 


 CULTURE ROUTINE + SMEAR [RM] Routine Lab  10/12/19 16:32 Ordered


 


 UA W/MICROSCOPIC [URIN] Routine Lab  10/12/19 16:32 Ordered


 


 Acetaminophen [Tylenol] Med  10/12/19 16:32 Ordered





 650 mg PO Q4H PRN   


 


 Enoxaparin [Lovenox] Med  10/12/19 16:45 Ordered





 40 mg SUBCUT Q24H   


 


 HYDROmorphone [Dilaudid] Med  10/12/19 16:32 Ordered





 0.25 mg IVPUSH Q2H PRN   


 


 Ketorolac [Toradol] Med  10/12/19 16:32 Ordered





 30 mg IVPUSH Q6H PRN   


 


 Nicotine [Habitrol] Med  10/12/19 16:45 Ordered





 7 mg TRDERM DAILY   


 


 Ondansetron [Zofran ODT] Med  10/12/19 16:32 Ordered





 4 mg PO Q4H PRN   


 


 Promethazine [Phenergan] 12.5 mg Med  10/12/19 16:32 Ordered





 Sodium Chloride 0.9% [Normal Saline] 50 ml   





 IV Q6H   


 


 Sodium Chloride 0.9% @ 125 MLS/HR (1000ml) Med  10/12/19 16:45 Ordered





 Sodium Chloride 0.9% [Normal Saline] 1,000 ml   





 IV ASDIRECTED   


 


 Sodium Chloride 0.9% [Saline Flush] Med  10/12/19 16:32 Ordered





 10 ml FLUSH ASDIRECTED PRN   


 


 Antiembolic Hose [OM.PC] Per Unit Routine Oth  10/12/19 16:34 Ordered


 


 Blood Culture x2 Reflex Set [OM.PC] Urgent Oth  10/12/19 16:32 Ordered


 


 Peripheral IV Insertion Adult [OM.PC] Routine Oth  10/12/19 16:32 Ordered


 


 Resuscitation Status Routine Resus Stat  10/12/19 16:32 Ordered








 Medication Orders





Acetaminophen (Tylenol)  650 mg PO Q4H PRN


   PRN Reason: Pain (Mild 1-3)/fever


Enoxaparin Sodium (Lovenox)  40 mg SUBCUT Q24H TRACE


Hydromorphone HCl (Dilaudid)  0.25 mg IVPUSH Q2H PRN


   PRN Reason: Pain (severe 7-10)


Promethazine HCl 12.5 mg/ (Sodium Chloride)  50.5 mls @ 200 mls/hr IV Q6H PRN


   PRN Reason: Nausea/Vomiting


Sodium Chloride (Normal Saline)  1,000 mls @ 125 mls/hr IV ASDIRECTED TRACE


Ketorolac Tromethamine (Toradol)  30 mg IVPUSH Q6H PRN


   PRN Reason: Pain (moderate 4-6)


Nicotine (Habitrol)  7 mg TRDERM DAILY TRACE


Ondansetron HCl (Zofran Odt)  4 mg PO Q4H PRN


   PRN Reason: nausea, able to take PO


Sodium Chloride (Saline Flush)  10 ml FLUSH ASDIRECTED PRN


   PRN Reason: Keep Vein Open








Assessment/Plan Comment:: 


Admit for observation: IV antibiotics, CBC, CMP, CRP, wound culture, Blood 

cultures x 2, Urine. Tylenol as needed for fever, Ketorolac as needed moderate 

pain, receive dose in clinic around 4 pm. Dilaudid as needed severe pain. 

Dressed with 4x4 gauze, & ABD pad. It is draining, if worsens will do CT abdomen

/pelvis with contrast and consult surgery. FULL CODE.








- Mortality Measure


Prognosis:: Good

## 2019-10-12 NOTE — PCM.PN
- General Info


Date of Service: 10/12/19


Subjective Update: 


Called to review labs/patient. She is running a temp,blood sugar high. Drainage,

smelly on the sacrum





- Review of Systems


HEENT: Reports: No Symptoms


Pulmonary: Reports: No Symptoms


Cardiovascular: Reports: No Symptoms


Gastrointestinal: Reports: Diarrhea





- Patient Data


Vitals - Most Recent: 


 Last Vital Signs











Temp  101.1 F H  10/12/19 16:32


 


Pulse  114 H  10/12/19 16:32


 


Resp  16   10/12/19 16:32


 


BP  112/60   10/12/19 16:32


 


Pulse Ox  95   10/12/19 16:32











Weight - Most Recent: 118.025 kg


Lab Results Last 24 Hours: 


 Laboratory Results - last 24 hr











  10/12/19 10/12/19 10/12/19 Range/Units





  17:00 17:00 17:00 


 


WBC  20.0 H    (4.5-12.0)  X10-3/uL


 


RBC  5.10    (3.23-5.20)  x10(6)uL


 


Hgb  14.4    (11.5-15.5)  g/dL


 


Hct  42.9    (30.0-51.3)  %


 


MCV  84.0    (80-96)  fL


 


MCH  28.2    (27.7-33.6)  pg


 


MCHC  33.5    (32.2-35.4)  g/dL


 


RDW  12.7    (11.5-15.5)  %


 


Plt Count  157    (125-369)  X10(3)uL


 


MPV  11.7 H    (7.4-10.4)  fL


 


Add Manual Diff  Yes    


 


Neutrophils % (Manual)  79    (46-82)  %


 


Band Neutrophils %  5    (0-6)  %


 


Lymphocytes % (Manual)  7 L    (13-37)  %


 


Monocytes % (Manual)  9    (4-12)  %


 


Toxic Granulation  Few H    (NOT SEEN)  


 


Sodium   130 L D   (135-145)  mmol/L


 


Potassium   3.2 L   (3.5-5.3)  mmol/L


 


Chloride   95 L D   (100-110)  mmol/L


 


Carbon Dioxide   22   (21-32)  mmol/L


 


BUN   11   (7-18)  mg/dL


 


Creatinine   0.8   (0.55-1.02)  mg/dL


 


Est Cr Clr Drug Dosing   99.01   mL/min


 


Estimated GFR (MDRD)   > 60   (>60)  


 


BUN/Creatinine Ratio   13.8   (9-20)  


 


Glucose   491 H* D   ()  mg/dL


 


Calcium   8.8   (8.6-10.2)  mg/dL


 


Total Bilirubin   0.5   (0.1-1.3)  mg/dL


 


AST   16  D   (5-25)  IU/L


 


ALT   26  D   (12-36)  U/L


 


Alkaline Phosphatase   193 H   ()  IU/L


 


C-Reactive Protein    20.9 H*  (0.5-0.9)  mg/dL


 


Total Protein   6.9   (6.0-8.0)  g/dL


 


Albumin   2.2 L   (3.5-5.2)  g/dL


 


Globulin   4.7   g/dL


 


Albumin/Globulin Ratio   0.5   


 


Urine Color     (YELLOW)  


 


Urine Appearance     (CLEAR)  


 


Urine pH     (5.0-6.5)  


 


Ur Specific Gravity     (1.010-1.025)  


 


Urine Protein     (NEGATIVE)  mg/dL


 


Urine Glucose (UA)     (NORMAL)  mg/dL


 


Urine Ketones     (NEGATIVE)  mg/dL


 


Urine Occult Blood     (NEGATIVE)  


 


Urine Nitrite     (NEGATIVE)  


 


Urine Bilirubin     (NEGATIVE)  


 


Urine Urobilinogen     (NEGATIVE)  mg/dL


 


Ur Leukocyte Esterase     (NEGATIVE)  


 


Urine RBC     (0-5)  


 


Urine WBC     (0-5)  


 


Ur Squamous Epith Cells     (NS,R,O)  


 


Amorphous Sediment     


 


Urine Bacteria     (NS)  














  10/12/19 Range/Units





  17:20 


 


WBC   (4.5-12.0)  X10-3/uL


 


RBC   (3.23-5.20)  x10(6)uL


 


Hgb   (11.5-15.5)  g/dL


 


Hct   (30.0-51.3)  %


 


MCV   (80-96)  fL


 


MCH   (27.7-33.6)  pg


 


MCHC   (32.2-35.4)  g/dL


 


RDW   (11.5-15.5)  %


 


Plt Count   (125-369)  X10(3)uL


 


MPV   (7.4-10.4)  fL


 


Add Manual Diff   


 


Neutrophils % (Manual)   (46-82)  %


 


Band Neutrophils %   (0-6)  %


 


Lymphocytes % (Manual)   (13-37)  %


 


Monocytes % (Manual)   (4-12)  %


 


Toxic Granulation   (NOT SEEN)  


 


Sodium   (135-145)  mmol/L


 


Potassium   (3.5-5.3)  mmol/L


 


Chloride   (100-110)  mmol/L


 


Carbon Dioxide   (21-32)  mmol/L


 


BUN   (7-18)  mg/dL


 


Creatinine   (0.55-1.02)  mg/dL


 


Est Cr Clr Drug Dosing   mL/min


 


Estimated GFR (MDRD)   (>60)  


 


BUN/Creatinine Ratio   (9-20)  


 


Glucose   ()  mg/dL


 


Calcium   (8.6-10.2)  mg/dL


 


Total Bilirubin   (0.1-1.3)  mg/dL


 


AST   (5-25)  IU/L


 


ALT   (12-36)  U/L


 


Alkaline Phosphatase   ()  IU/L


 


C-Reactive Protein   (0.5-0.9)  mg/dL


 


Total Protein   (6.0-8.0)  g/dL


 


Albumin   (3.5-5.2)  g/dL


 


Globulin   g/dL


 


Albumin/Globulin Ratio   


 


Urine Color  Yellow  (YELLOW)  


 


Urine Appearance  Clear  (CLEAR)  


 


Urine pH  6.0  (5.0-6.5)  


 


Ur Specific Gravity  1.005 L  (1.010-1.025)  


 


Urine Protein  Trace  (NEGATIVE)  mg/dL


 


Urine Glucose (UA)  >1000 H  (NORMAL)  mg/dL


 


Urine Ketones  50 H  (NEGATIVE)  mg/dL


 


Urine Occult Blood  Large H  (NEGATIVE)  


 


Urine Nitrite  Negative  (NEGATIVE)  


 


Urine Bilirubin  Negative  (NEGATIVE)  


 


Urine Urobilinogen  Normal  (NEGATIVE)  mg/dL


 


Ur Leukocyte Esterase  Small H  (NEGATIVE)  


 


Urine RBC  0-5  (0-5)  


 


Urine WBC  5-10 H  (0-5)  


 


Ur Squamous Epith Cells  Occasional  (NS,R,O)  


 


Amorphous Sediment  Few  


 


Urine Bacteria  Few H  (NS)  











Med Orders - Current: 


 Current Medications





Acetaminophen (Tylenol)  650 mg PO Q4H PRN


   PRN Reason: Pain (Mild 1-3)/fever


Enoxaparin Sodium (Lovenox)  40 mg SUBCUT Q24H [facility]


   Last Admin: 10/12/19 18:20 Dose:  40 mg


Hydromorphone HCl (Dilaudid)  0.25 mg IVPUSH Q2H PRN


   PRN Reason: Pain (severe 7-10)


Promethazine HCl 12.5 mg/ (Sodium Chloride)  50.5 mls @ 200 mls/hr IV Q6H PRN


   PRN Reason: Nausea/Vomiting


Sodium Chloride (Normal Saline)  1,000 mls @ 125 mls/hr IV ASDIRECTED [facility]


Ceftriaxone Sodium 2 gm/ (Sodium Chloride)  50 mls @ 200 mls/hr IV Q24H [facility]


Vancomycin HCl 2 gm/ Sodium (Chloride)  250 mls @ 167 mls/hr IV Q12H [facility]


Ketorolac Tromethamine (Toradol)  30 mg IVPUSH Q6H PRN


   PRN Reason: Pain (moderate 4-6)


Nicotine (Habitrol)  7 mg TRDERM DAILY [facility]


   Last Admin: 10/12/19 18:20 Dose:  Not Given


Ondansetron HCl (Zofran Odt)  4 mg PO Q4H PRN


   PRN Reason: nausea, able to take PO


Sodium Chloride (Saline Flush)  10 ml FLUSH ASDIRECTED PRN


   PRN Reason: Keep Vein Open


   Last Admin: 10/12/19 18:22 Dose:  10 ml





Discontinued Medications





Ciprofloxacin/Dextrose 400 mg/ (Premix)  200 mls @ 200 mls/hr IV Q12H [facility]


   Last Admin: 10/12/19 18:11 Dose:  200 mls/hr


Metronidazole 500 mg/ Premix  100 mls @ 100 mls/hr IV Q6H [facility]


   Last Admin: 10/12/19 19:18 Dose:  100 mls/hr


Sodium Chloride (Normal Saline)  1,000 mls @ 999 mls/hr IV ONETIME ONE


   Stop: 10/12/19 20:07


   Last Admin: 10/12/19 19:19 Dose:  999 mls/hr


Vancomycin HCl 1 gm/ Sodium (Chloride)  250 mls @ 167 mls/hr IV Q12H [facility]


Insulin Human Regular (Humulin R)  5 unit IV ONETIME ONE


   Stop: 10/12/19 19:07


   Last Admin: 10/12/19 19:27 Dose:  5 units











- Exam


General: Alert


HEENT: Pupils Equal


Skin: Warm


Wound/Incisions: Erythema


Neurological: No New Focal Deficit


Psy/Mental Status: Alert, Normal Affect


Physical Findings Comments:: 


Indurated,warm tender buttock and sacral area. Foul smelling drainage noted.





- Problem List & Annotations


(1) SIRS (systemic inflammatory response syndrome)


SNOMED Code(s): 089254321


   Code(s): R65.10 - SIRS OF NON-INFECTIOUS ORIGIN W/O ACUTE ORGAN DYSFUNCTION 

  Status: Acute   Current Visit: Yes   





(2) Pilonidal cyst with abscess


SNOMED Code(s): 21681205


   Code(s): L05.01 - PILONIDAL CYST WITH ABSCESS   Status: Acute   Current Visit

: Yes   





- Problem List Review


Problem List Initiated/Reviewed/Updated: Yes





- My Orders


Last 24 Hours: 


My Active Orders





10/12/19 19:30


cefTRIAXone [Rocephin] 2 gm   Sodium Chloride 0.9% [Normal Saline] 50 ml IV 

Q24H 





10/12/19 21:00


Vancomycin 2 gm   Sodium Chloride 0.9% [Normal Saline] 250 ml IV Q12H 





10/13/19 05:11


BASIC METABOLIC PANEL,BMP [CHEM] AM 


CBC WITH AUTO DIFF [HEME] AM 














- Plan


Plan:: 


I called the surgeon on call,for possible debridement. He advised IV 

antibiotics and expression of the area. I changed antibiotics to cover MRSA. IV 

Rocephin and IV Vanco. I also recommend 1 L NS bolus. Repeat Labs in 

AM.Consider debridement,packing under local anesthesia tomorrow if no 

improvement.

## 2019-10-13 RX ADMIN — KETOROLAC TROMETHAMINE PRN MG: 30 INJECTION, SOLUTION INTRAMUSCULAR at 06:29

## 2019-10-13 RX ADMIN — HYDROMORPHONE HYDROCHLORIDE PRN MG: 2 INJECTION INTRAMUSCULAR; INTRAVENOUS; SUBCUTANEOUS at 14:17

## 2019-10-13 RX ADMIN — KETOROLAC TROMETHAMINE PRN MG: 30 INJECTION, SOLUTION INTRAMUSCULAR at 16:58

## 2019-10-13 RX ADMIN — HYDROMORPHONE HYDROCHLORIDE PRN MG: 2 INJECTION INTRAMUSCULAR; INTRAVENOUS; SUBCUTANEOUS at 20:46

## 2019-10-13 RX ADMIN — HYDROMORPHONE HYDROCHLORIDE PRN MG: 2 INJECTION INTRAMUSCULAR; INTRAVENOUS; SUBCUTANEOUS at 03:59

## 2019-10-13 RX ADMIN — INSULIN LISPRO SCH UNITS: 100 INJECTION, SOLUTION INTRAVENOUS; SUBCUTANEOUS at 17:03

## 2019-10-13 RX ADMIN — HYDROMORPHONE HYDROCHLORIDE PRN MG: 2 INJECTION INTRAMUSCULAR; INTRAVENOUS; SUBCUTANEOUS at 00:55

## 2019-10-13 RX ADMIN — INSULIN LISPRO SCH UNITS: 100 INJECTION, SOLUTION INTRAVENOUS; SUBCUTANEOUS at 12:18

## 2019-10-13 RX ADMIN — Medication SCH MG: at 20:45

## 2019-10-13 RX ADMIN — HYDROMORPHONE HYDROCHLORIDE PRN MG: 2 INJECTION INTRAMUSCULAR; INTRAVENOUS; SUBCUTANEOUS at 09:29

## 2019-10-13 RX ADMIN — HYDROMORPHONE HYDROCHLORIDE PRN MG: 2 INJECTION INTRAMUSCULAR; INTRAVENOUS; SUBCUTANEOUS at 12:14

## 2019-10-13 RX ADMIN — Medication PRN ML: at 12:17

## 2019-10-13 NOTE — PCM.PN
- General Info


Date of Service: 10/13/19


Admission Dx/Problem (Free Text): 


Patient had diarrhea this morning with posttussive emesis but states after 

episode she feels better. Dr Brannon assessed patient last night after lab 

came back, changed her antibiotics to Rocephin & Vancomycin. WBC is 17,000 this 

morning. No fevers since admission. Having a lot of drainage from right buttock.





- Patient Data


Vitals - Most Recent: 


 Last Vital Signs











Temp  36.6 C   10/13/19 09:00


 


Pulse  98   10/13/19 09:00


 


Resp  14   10/13/19 09:00


 


BP  108/72   10/13/19 09:00


 


Pulse Ox  95   10/13/19 09:00











Weight - Most Recent: 118.025 kg


I&O - Last 24 Hours: 


 Intake & Output











 10/12/19 10/13/19 10/13/19





 22:59 06:59 14:59


 


Intake Total  2593 


 


Balance  2593 











Lab Results Last 24 Hours: 


 Laboratory Results - last 24 hr











  10/12/19 10/12/19 10/12/19 Range/Units





  17:00 17:00 17:00 


 


WBC  20.0 H    (4.5-12.0)  X10-3/uL


 


RBC  5.10    (3.23-5.20)  x10(6)uL


 


Hgb  14.4    (11.5-15.5)  g/dL


 


Hct  42.9    (30.0-51.3)  %


 


MCV  84.0    (80-96)  fL


 


MCH  28.2    (27.7-33.6)  pg


 


MCHC  33.5    (32.2-35.4)  g/dL


 


RDW  12.7    (11.5-15.5)  %


 


Plt Count  157    (125-369)  X10(3)uL


 


MPV  11.7 H    (7.4-10.4)  fL


 


Add Manual Diff  Yes    


 


Neutrophils % (Manual)  79    (46-82)  %


 


Band Neutrophils %  5    (0-6)  %


 


Lymphocytes % (Manual)  7 L    (13-37)  %


 


Monocytes % (Manual)  9    (4-12)  %


 


Eosinophils % (Manual)     (0-5)  %


 


Hypersegmented Neuts     


 


Toxic Granulation  Few H    (NOT SEEN)  


 


Sodium   130 L D   (135-145)  mmol/L


 


Potassium   3.2 L   (3.5-5.3)  mmol/L


 


Chloride   95 L D   (100-110)  mmol/L


 


Carbon Dioxide   22   (21-32)  mmol/L


 


BUN   11   (7-18)  mg/dL


 


Creatinine   0.8   (0.55-1.02)  mg/dL


 


Est Cr Clr Drug Dosing   99.01   mL/min


 


Estimated GFR (MDRD)   > 60   (>60)  


 


BUN/Creatinine Ratio   13.8   (9-20)  


 


Glucose   491 H* D   ()  mg/dL


 


POC Glucose     ()  mg/dL


 


Calcium   8.8   (8.6-10.2)  mg/dL


 


Total Bilirubin   0.5   (0.1-1.3)  mg/dL


 


AST   16  D   (5-25)  IU/L


 


ALT   26  D   (12-36)  U/L


 


Alkaline Phosphatase   193 H   ()  IU/L


 


C-Reactive Protein    20.9 H*  (0.5-0.9)  mg/dL


 


Total Protein   6.9   (6.0-8.0)  g/dL


 


Albumin   2.2 L   (3.5-5.2)  g/dL


 


Globulin   4.7   g/dL


 


Albumin/Globulin Ratio   0.5   


 


Urine Color     (YELLOW)  


 


Urine Appearance     (CLEAR)  


 


Urine pH     (5.0-6.5)  


 


Ur Specific Gravity     (1.010-1.025)  


 


Urine Protein     (NEGATIVE)  mg/dL


 


Urine Glucose (UA)     (NORMAL)  mg/dL


 


Urine Ketones     (NEGATIVE)  mg/dL


 


Urine Occult Blood     (NEGATIVE)  


 


Urine Nitrite     (NEGATIVE)  


 


Urine Bilirubin     (NEGATIVE)  


 


Urine Urobilinogen     (NEGATIVE)  mg/dL


 


Ur Leukocyte Esterase     (NEGATIVE)  


 


Urine RBC     (0-5)  


 


Urine WBC     (0-5)  


 


Ur Squamous Epith Cells     (NS,R,O)  


 


Amorphous Sediment     


 


Urine Bacteria     (NS)  














  10/12/19 10/12/19 10/12/19 Range/Units





  17:20 21:31 21:40 


 


WBC     (4.5-12.0)  X10-3/uL


 


RBC     (3.23-5.20)  x10(6)uL


 


Hgb     (11.5-15.5)  g/dL


 


Hct     (30.0-51.3)  %


 


MCV     (80-96)  fL


 


MCH     (27.7-33.6)  pg


 


MCHC     (32.2-35.4)  g/dL


 


RDW     (11.5-15.5)  %


 


Plt Count     (125-369)  X10(3)uL


 


MPV     (7.4-10.4)  fL


 


Add Manual Diff     


 


Neutrophils % (Manual)     (46-82)  %


 


Band Neutrophils %     (0-6)  %


 


Lymphocytes % (Manual)     (13-37)  %


 


Monocytes % (Manual)     (4-12)  %


 


Eosinophils % (Manual)     (0-5)  %


 


Hypersegmented Neuts     


 


Toxic Granulation     (NOT SEEN)  


 


Sodium     (135-145)  mmol/L


 


Potassium     (3.5-5.3)  mmol/L


 


Chloride     (100-110)  mmol/L


 


Carbon Dioxide     (21-32)  mmol/L


 


BUN     (7-18)  mg/dL


 


Creatinine     (0.55-1.02)  mg/dL


 


Est Cr Clr Drug Dosing     mL/min


 


Estimated GFR (MDRD)     (>60)  


 


BUN/Creatinine Ratio     (9-20)  


 


Glucose    426 H*  ()  mg/dL


 


POC Glucose   420 H*   ()  mg/dL


 


Calcium     (8.6-10.2)  mg/dL


 


Total Bilirubin     (0.1-1.3)  mg/dL


 


AST     (5-25)  IU/L


 


ALT     (12-36)  U/L


 


Alkaline Phosphatase     ()  IU/L


 


C-Reactive Protein     (0.5-0.9)  mg/dL


 


Total Protein     (6.0-8.0)  g/dL


 


Albumin     (3.5-5.2)  g/dL


 


Globulin     g/dL


 


Albumin/Globulin Ratio     


 


Urine Color  Yellow    (YELLOW)  


 


Urine Appearance  Clear    (CLEAR)  


 


Urine pH  6.0    (5.0-6.5)  


 


Ur Specific Gravity  1.005 L    (1.010-1.025)  


 


Urine Protein  Trace    (NEGATIVE)  mg/dL


 


Urine Glucose (UA)  >1000 H    (NORMAL)  mg/dL


 


Urine Ketones  50 H    (NEGATIVE)  mg/dL


 


Urine Occult Blood  Large H    (NEGATIVE)  


 


Urine Nitrite  Negative    (NEGATIVE)  


 


Urine Bilirubin  Negative    (NEGATIVE)  


 


Urine Urobilinogen  Normal    (NEGATIVE)  mg/dL


 


Ur Leukocyte Esterase  Small H    (NEGATIVE)  


 


Urine RBC  0-5    (0-5)  


 


Urine WBC  5-10 H    (0-5)  


 


Ur Squamous Epith Cells  Occasional    (NS,R,O)  


 


Amorphous Sediment  Few    


 


Urine Bacteria  Few H    (NS)  














  10/13/19 10/13/19 10/13/19 Range/Units





  06:25 06:25 11:45 


 


WBC  17.0 H    (4.5-12.0)  X10-3/uL


 


RBC  4.98    (3.23-5.20)  x10(6)uL


 


Hgb  14.1    (11.5-15.5)  g/dL


 


Hct  41.8    (30.0-51.3)  %


 


MCV  83.9    (80-96)  fL


 


MCH  28.3    (27.7-33.6)  pg


 


MCHC  33.7    (32.2-35.4)  g/dL


 


RDW  12.4    (11.5-15.5)  %


 


Plt Count  133    (125-369)  X10(3)uL


 


MPV  10.1    (7.4-10.4)  fL


 


Add Manual Diff  Yes    


 


Neutrophils % (Manual)  74    (46-82)  %


 


Band Neutrophils %  4    (0-6)  %


 


Lymphocytes % (Manual)  14    (13-37)  %


 


Monocytes % (Manual)  7    (4-12)  %


 


Eosinophils % (Manual)  1    (0-5)  %


 


Hypersegmented Neuts  Few    


 


Toxic Granulation  Moderate H    (NOT SEEN)  


 


Sodium   135   (135-145)  mmol/L


 


Potassium   3.1 L   (3.5-5.3)  mmol/L


 


Chloride   100  D   (100-110)  mmol/L


 


Carbon Dioxide   23   (21-32)  mmol/L


 


BUN   7   (7-18)  mg/dL


 


Creatinine   0.6   (0.55-1.02)  mg/dL


 


Est Cr Clr Drug Dosing   132.02   mL/min


 


Estimated GFR (MDRD)   > 60   (>60)  


 


BUN/Creatinine Ratio   11.7   (9-20)  


 


Glucose   292 H D   ()  mg/dL


 


POC Glucose    354 H  ()  mg/dL


 


Calcium   8.1 L   (8.6-10.2)  mg/dL


 


Total Bilirubin     (0.1-1.3)  mg/dL


 


AST     (5-25)  IU/L


 


ALT     (12-36)  U/L


 


Alkaline Phosphatase     ()  IU/L


 


C-Reactive Protein     (0.5-0.9)  mg/dL


 


Total Protein     (6.0-8.0)  g/dL


 


Albumin     (3.5-5.2)  g/dL


 


Globulin     g/dL


 


Albumin/Globulin Ratio     


 


Urine Color     (YELLOW)  


 


Urine Appearance     (CLEAR)  


 


Urine pH     (5.0-6.5)  


 


Ur Specific Gravity     (1.010-1.025)  


 


Urine Protein     (NEGATIVE)  mg/dL


 


Urine Glucose (UA)     (NORMAL)  mg/dL


 


Urine Ketones     (NEGATIVE)  mg/dL


 


Urine Occult Blood     (NEGATIVE)  


 


Urine Nitrite     (NEGATIVE)  


 


Urine Bilirubin     (NEGATIVE)  


 


Urine Urobilinogen     (NEGATIVE)  mg/dL


 


Ur Leukocyte Esterase     (NEGATIVE)  


 


Urine RBC     (0-5)  


 


Urine WBC     (0-5)  


 


Ur Squamous Epith Cells     (NS,R,O)  


 


Amorphous Sediment     


 


Urine Bacteria     (NS)  











Nick Results Last 24 Hours: 


 Microbiology











 10/12/19 16:32 Gram Stain - Final





 Drainage - Buttock, Left 











Med Orders - Current: 


 Current Medications





Acetaminophen (Tylenol)  650 mg PO Q4H PRN


   PRN Reason: Pain (Mild 1-3)/fever


   Last Admin: 10/13/19 06:30 Dose:  650 mg


Enoxaparin Sodium (Lovenox)  40 mg SUBCUT Q24H [facility]


   Last Admin: 10/12/19 18:20 Dose:  40 mg


Hydromorphone HCl (Dilaudid)  0.25 mg IVPUSH Q2H PRN


   PRN Reason: Pain (severe 7-10)


   Last Admin: 10/13/19 12:14 Dose:  0.25 mg


Hydroxyzine Pamoate (Vistaril)  50 - 100 mg PO BID PRN


   PRN Reason: Anxiety


Promethazine HCl 12.5 mg/ (Sodium Chloride)  50.5 mls @ 200 mls/hr IV Q6H PRN


   PRN Reason: Nausea/Vomiting


Sodium Chloride (Normal Saline)  1,000 mls @ 125 mls/hr IV ASDIRECTED [facility]


   Last Admin: 10/13/19 06:32 Dose:  125 mls/hr


Ceftriaxone Sodium 2 gm/ (Sodium Chloride)  50 mls @ 200 mls/hr IV Q24H [facility]


   Last Admin: 10/12/19 20:19 Dose:  200 mls/hr


Vancomycin HCl 2 gm/ Sodium (Chloride)  500 mls @ 250 mls/hr IV Q12H [facility]


   Last Admin: 10/13/19 09:34 Dose:  250 mls/hr


Insulin Human Lispro (Humalog)  15 unit SUBCUT TIDMEALS [facility]


   Last Admin: 10/13/19 12:18 Dose:  15 units


Insulin Human Lispro (Humalog)  0 unit SUBCUT TIDMEALS [facility]; Protocol


   Last Admin: 10/13/19 12:18 Dose:  5 units


Ketorolac Tromethamine (Toradol)  30 mg IVPUSH Q6H PRN


   PRN Reason: Pain (moderate 4-6)


   Last Admin: 10/13/19 06:29 Dose:  30 mg


Nicotine (Habitrol)  7 mg TRDERM DAILY [facility]


   Last Admin: 10/13/19 09:33 Dose:  Not Given


Non-Formulary Medication (Eszopiclone [Eszopiclone])  3 mg PO BEDTIME PRN


   PRN Reason: Insomnia


Non-Formulary Medication (Insulin Degludec [Tresiba Flextouch U-100])  50 unit 

SQ DAILY [facility]


Ondansetron HCl (Zofran Odt)  4 mg PO Q4H PRN


   PRN Reason: nausea, able to take PO


Pantoprazole Sodium (Protonix***)  40 mg PO ACBREAKFAST [facility]


Pregabalin (Lyrica)  300 mg PO BID [facility]


   Last Admin: 10/13/19 12:30 Dose:  300 mg


Sodium Chloride (Saline Flush)  10 ml FLUSH ASDIRECTED PRN


   PRN Reason: Keep Vein Open


   Last Admin: 10/13/19 12:17 Dose:  10 ml


Vancomycin HCl (Pharmacy To Dose - Vancomycin)  0 dose .XX DAILY [facility]





Discontinued Medications





Ciprofloxacin/Dextrose 400 mg/ (Premix)  200 mls @ 200 mls/hr IV Q12H [facility]


   Last Admin: 10/12/19 18:11 Dose:  200 mls/hr


Metronidazole 500 mg/ Premix  100 mls @ 100 mls/hr IV Q6H [facility]


   Last Admin: 10/12/19 22:16 Dose:  Not Given


Sodium Chloride (Normal Saline)  1,000 mls @ 999 mls/hr IV ONETIME ONE


   Stop: 10/12/19 20:07


   Last Admin: 10/12/19 19:19 Dose:  999 mls/hr


Vancomycin HCl 1 gm/ Sodium (Chloride)  250 mls @ 167 mls/hr IV Q12H [facility]


   Last Admin: 10/12/19 22:15 Dose:  Not Given


Vancomycin HCl 2 gm/ Sodium (Chloride)  250 mls @ 167 mls/hr IV Q12H [facility]


   Last Admin: 10/12/19 22:14 Dose:  Not Given


Vancomycin HCl 2 gm/ Sodium (Chloride)  500 mls @ 334.014 mls/hr IV Q12H [facility]


   Last Admin: 10/12/19 21:20 Dose:  334.014 mls/hr


Insulin Human Regular (Humulin R)  5 unit IV ONETIME ONE


   Stop: 10/12/19 19:07


   Last Admin: 10/12/19 19:27 Dose:  5 units


Insulin Human Regular (Humulin R)  10 unit SUBCUT ONETIME ONE


   Stop: 10/12/19 22:10


   Last Admin: 10/12/19 22:54 Dose:  10 units


Iopamidol (Isovue-370 (76%))  100 ml IV ONETIME ONE


   Stop: 10/13/19 08:28


   Last Admin: 10/13/19 08:38 Dose:  100 ml











- Exam


General: Alert, Oriented, Cooperative, No Acute Distress


Lungs: Clear to Auscultation, Normal Respiratory Effort


Cardiovascular: Regular Rate, Regular Rhythm


GI/Abdominal Exam: Normal Bowel Sounds, Soft, No Distention, Tender


Wound/Incisions: Drainage (still lot of foul drainage, blood-tinged), Erythema 

Improving (receded from skin markings, still induration more right than left, 

TTP)





- Problem List & Annotations


(1) Cellulitis and abscess of buttock


SNOMED Code(s): 403620336


   Code(s): L02.31 - CUTANEOUS ABSCESS OF BUTTOCK; L03.317 - CELLULITIS OF 

BUTTOCK   Status: Acute   Current Visit: Yes   





(2) Nausea & vomiting


SNOMED Code(s): 15706576


   Code(s): R11.2 - NAUSEA WITH VOMITING, UNSPECIFIED   Status: Resolved   

Current Visit: Yes   





(3) Fever


SNOMED Code(s): 857245452


   Code(s): R50.9 - FEVER, UNSPECIFIED   Status: Resolved   Current Visit: Yes 

  





(4) Fibromyalgia


SNOMED Code(s): 227855566


   Code(s): M79.7 - FIBROMYALGIA   Status: Acute   Current Visit: Yes   





(5) Diabetes mellitus with insulin therapy


SNOMED Code(s): 109619532


   Code(s): E11.9 - TYPE 2 DIABETES MELLITUS WITHOUT COMPLICATIONS; Z79.4 - 

LONG TERM (CURRENT) USE OF INSULIN   Status: Acute   Current Visit: Yes   





- Problem List Review


Problem List Initiated/Reviewed/Updated: Yes





- My Orders


Last 24 Hours: 


My Active Orders





10/12/19 16:32


Patient Status [ADT] Routine 


Antiembolic Devices [RC] .Routine 


Blood Glucose Check, Bedside [RC] WITHMEALSANDBED 


May Shower [RC] ASDIRECTED 


Oxygen Therapy [RC] PRN 


Peripheral IV Care [RC] 08,16,00 


Up ad Anali [RC] ASDIRECTED 


VTE/DVT Education [RC] Per Unit Routine 


Vital Signs [RC] 08,12,16,20 


CULTURE ROUTINE + SMEAR [RM] Routine 


Acetaminophen [Tylenol]   650 mg PO Q4H PRN 


HYDROmorphone [Dilaudid]   0.25 mg IVPUSH Q2H PRN 


Ketorolac [Toradol]   30 mg IVPUSH Q6H PRN 


Ondansetron [Zofran ODT]   4 mg PO Q4H PRN 


Promethazine [Phenergan] 12.5 mg   Sodium Chloride 0.9% [Normal Saline] 50 ml 

IV Q6H 


Sodium Chloride 0.9% [Saline Flush]   10 ml FLUSH ASDIRECTED PRN 


Blood Culture x2 Reflex Set [OM.PC] Urgent 


Peripheral IV Insertion Adult [OM.PC] Routine 


Resuscitation Status Routine 





10/12/19 16:34


Ambulate [RC] PER UNIT ROUTINE 


Antiembolic Hose [OM.PC] Per Unit Routine 





10/12/19 16:45


Nicotine [Habitrol]   7 mg TRDERM DAILY 


Sodium Chloride 0.9% [Normal Saline] 1,000 ml IV ASDIRECTED 





10/12/19 17:00


CULTURE BLOOD [BC] Urgent 


CULTURE BLOOD [BC] Urgent 


Enoxaparin [Lovenox]   40 mg SUBCUT Q24H 





10/12/19 17:20


CULTURE URINE [RM] Routine 





10/12/19 Dinner


Consistent Carbohydrate Diet [DIET] 





10/13/19 09:00


Eszopiclone [Eszopiclone]   3 mg PO BEDTIME PRN 


Insulin Degludec [Tresiba Flextouch U-100]   50 unit SQ DAILY 


Pharmacy to Dose - Vancomycin   0 dose .XX DAILY 


hydrOXYzine pamoate [Vistaril]   50 - 100 mg PO BID PRN 





10/13/19 12:00


Insulin Lispro [HumaLOG]   See Protocol  SUBCUT TIDMEALS 





10/13/19 12:15


Insulin Lispro [HumaLOG]   15 unit SUBCUT TIDMEALS 


Pregabalin [Lyrica]   300 mg PO BID 





10/13/19 13:02


Preg Urine [HCG QUALITATIVE,URINE] [URCHEM] Routine 





10/14/19 07:30


Pantoprazole [ProTONIX***]   40 mg PO ACBREAKFAST 





10/14/19 08:00


Pelvis wo Cont [MR] Routine 





10/14/19 08:30


BASIC METABOLIC PANEL,BMP [CHEM] Routine 


CBC WITH AUTO DIFF [HEME] Routine 


VANCOMYCIN TROUGH [CHEM] Timed 














- Plan


Plan:: 


CT report from this morning showed extensive soft tissue thickening, emphysema 

and inflammatory changes within the gluteal regions and perianal regions 

compatible with an underlying soft tissue infection. No focal fluid collection 

is visualized to suggest a drainable abscess. MRI would be of benefit to better 

evaluate for a perianal fistula or small abscess. MRI not available today will 

get in the morning. Repeat labs. Culture had gram positive cocci and gram 

negative rods, will continue antibiotics until ID/NICK is back.

## 2019-10-14 RX ADMIN — KETOROLAC TROMETHAMINE PRN MG: 30 INJECTION, SOLUTION INTRAMUSCULAR at 00:59

## 2019-10-14 RX ADMIN — POTASSIUM CHLORIDE SCH MEQ: 1500 TABLET, EXTENDED RELEASE ORAL at 20:32

## 2019-10-14 RX ADMIN — Medication SCH MG: at 20:24

## 2019-10-14 RX ADMIN — INSULIN LISPRO SCH: 100 INJECTION, SOLUTION INTRAVENOUS; SUBCUTANEOUS at 10:49

## 2019-10-14 RX ADMIN — HYDROMORPHONE HYDROCHLORIDE PRN MG: 2 INJECTION INTRAMUSCULAR; INTRAVENOUS; SUBCUTANEOUS at 20:24

## 2019-10-14 RX ADMIN — HYDROMORPHONE HYDROCHLORIDE PRN MG: 2 INJECTION INTRAMUSCULAR; INTRAVENOUS; SUBCUTANEOUS at 11:58

## 2019-10-14 RX ADMIN — KETOROLAC TROMETHAMINE PRN MG: 30 INJECTION, SOLUTION INTRAMUSCULAR at 09:09

## 2019-10-14 RX ADMIN — Medication PRN ML: at 18:07

## 2019-10-14 RX ADMIN — KETOROLAC TROMETHAMINE PRN MG: 30 INJECTION, SOLUTION INTRAMUSCULAR at 15:34

## 2019-10-14 RX ADMIN — INSULIN LISPRO SCH UNITS: 100 INJECTION, SOLUTION INTRAVENOUS; SUBCUTANEOUS at 12:39

## 2019-10-14 RX ADMIN — INSULIN LISPRO SCH UNITS: 100 INJECTION, SOLUTION INTRAVENOUS; SUBCUTANEOUS at 12:38

## 2019-10-14 RX ADMIN — INSULIN LISPRO SCH UNITS: 100 INJECTION, SOLUTION INTRAVENOUS; SUBCUTANEOUS at 17:05

## 2019-10-14 RX ADMIN — Medication PRN ML: at 20:26

## 2019-10-14 RX ADMIN — CIPROFLOXACIN SCH MLS/HR: 2 INJECTION, SOLUTION INTRAVENOUS at 19:10

## 2019-10-14 RX ADMIN — INSULIN GLARGINE SCH UNITS: 100 INJECTION, SOLUTION SUBCUTANEOUS at 08:08

## 2019-10-14 RX ADMIN — HYDROMORPHONE HYDROCHLORIDE PRN MG: 2 INJECTION INTRAMUSCULAR; INTRAVENOUS; SUBCUTANEOUS at 00:58

## 2019-10-14 RX ADMIN — Medication PRN ML: at 09:11

## 2019-10-14 RX ADMIN — HYDROMORPHONE HYDROCHLORIDE PRN MG: 2 INJECTION INTRAMUSCULAR; INTRAVENOUS; SUBCUTANEOUS at 16:08

## 2019-10-14 RX ADMIN — Medication SCH MG: at 09:10

## 2019-10-14 RX ADMIN — METRONIDAZOLE SCH MLS/HR: 500 SOLUTION INTRAVENOUS at 16:55

## 2019-10-14 RX ADMIN — INSULIN LISPRO SCH UNITS: 100 INJECTION, SOLUTION INTRAVENOUS; SUBCUTANEOUS at 08:08

## 2019-10-14 RX ADMIN — Medication PRN ML: at 11:59

## 2019-10-14 RX ADMIN — Medication PRN ML: at 21:34

## 2019-10-14 RX ADMIN — HYDROMORPHONE HYDROCHLORIDE PRN MG: 2 INJECTION INTRAMUSCULAR; INTRAVENOUS; SUBCUTANEOUS at 06:49

## 2019-10-14 RX ADMIN — KETOROLAC TROMETHAMINE PRN MG: 30 INJECTION, SOLUTION INTRAMUSCULAR at 21:34

## 2019-10-14 NOTE — PCM.PN
- General Info


Date of Service: 10/14/19


Admission Dx/Problem (Free Text): 


Patient is feeling a little better, coughing but declined nicotine patch, 

states she always coughs in the mornings. Started her period yesterday, states 

she does usually have diarrhea with it. Stool cultures were done yesterday and 

adding Imodium and probiotics. CT did not show focal fluid collection but 

recommended MRI to rule out fistula. No fevers. 





- Patient Data


Vitals - Most Recent: 


 Last Vital Signs











Temp  36.6 C   10/14/19 12:00


 


Pulse  100   10/14/19 12:00


 


Resp  14   10/14/19 12:00


 


BP  116/77   10/14/19 12:00


 


Pulse Ox  95   10/14/19 12:00











Weight - Most Recent: 118.025 kg


I&O - Last 24 Hours: 


 Intake & Output











 10/13/19 10/14/19 10/14/19





 22:59 06:59 14:59


 


Intake Total 773 1036 300


 


Balance 773 1036 300











Lab Results Last 24 Hours: 


 Laboratory Results - last 24 hr











  10/12/19 10/13/19 10/13/19 Range/Units





  16:30 16:57 20:45 


 


WBC     (4.5-12.0)  X10-3/uL


 


RBC     (3.23-5.20)  x10(6)uL


 


Hgb     (11.5-15.5)  g/dL


 


Hct     (30.0-51.3)  %


 


MCV     (80-96)  fL


 


MCH     (27.7-33.6)  pg


 


MCHC     (32.2-35.4)  g/dL


 


RDW     (11.5-15.5)  %


 


Plt Count     (125-369)  X10(3)uL


 


MPV     (7.4-10.4)  fL


 


Add Manual Diff     


 


Neutrophils % (Manual)     (46-82)  %


 


Band Neutrophils %     (0-6)  %


 


Lymphocytes % (Manual)     (13-37)  %


 


Monocytes % (Manual)     (4-12)  %


 


Eosinophils % (Manual)     (0-5)  %


 


Toxic Granulation     (NOT SEEN)  


 


Sodium     (135-145)  mmol/L


 


Potassium     (3.5-5.3)  mmol/L


 


Chloride     (100-110)  mmol/L


 


Carbon Dioxide     (21-32)  mmol/L


 


BUN     (7-18)  mg/dL


 


Creatinine     (0.55-1.02)  mg/dL


 


Est Cr Clr Drug Dosing     mL/min


 


Estimated GFR (MDRD)     (>60)  


 


BUN/Creatinine Ratio     (9-20)  


 


Glucose     ()  mg/dL


 


POC Glucose   345 H  317 H  ()  mg/dL


 


Calcium     (8.6-10.2)  mg/dL


 


Urine HCG, Qual  Negative    (NEGATIVE)  


 


Vancomycin Trough     (<0.8)  ug/mL














  10/14/19 10/14/19 10/14/19 Range/Units





  06:52 08:30 08:30 


 


WBC    15.4 H  (4.5-12.0)  X10-3/uL


 


RBC    5.27 H  (3.23-5.20)  x10(6)uL


 


Hgb    14.9  (11.5-15.5)  g/dL


 


Hct    44.1  (30.0-51.3)  %


 


MCV    83.6  (80-96)  fL


 


MCH    28.2  (27.7-33.6)  pg


 


MCHC    33.7  (32.2-35.4)  g/dL


 


RDW    12.8  (11.5-15.5)  %


 


Plt Count    185  (125-369)  X10(3)uL


 


MPV    10.6 H  (7.4-10.4)  fL


 


Add Manual Diff    Yes  


 


Neutrophils % (Manual)    78  (46-82)  %


 


Band Neutrophils %    2  (0-6)  %


 


Lymphocytes % (Manual)    10 L  (13-37)  %


 


Monocytes % (Manual)    9  (4-12)  %


 


Eosinophils % (Manual)    1  (0-5)  %


 


Toxic Granulation    Few H  (NOT SEEN)  


 


Sodium   135   (135-145)  mmol/L


 


Potassium   3.1 L   (3.5-5.3)  mmol/L


 


Chloride   100   (100-110)  mmol/L


 


Carbon Dioxide   26   (21-32)  mmol/L


 


BUN   4 L   (7-18)  mg/dL


 


Creatinine   0.7   (0.55-1.02)  mg/dL


 


Est Cr Clr Drug Dosing   113.16   mL/min


 


Estimated GFR (MDRD)   > 60   (>60)  


 


BUN/Creatinine Ratio   5.7 L   (9-20)  


 


Glucose   334 H   ()  mg/dL


 


POC Glucose  284 H    ()  mg/dL


 


Calcium   8.3 L   (8.6-10.2)  mg/dL


 


Urine HCG, Qual     (NEGATIVE)  


 


Vancomycin Trough   5.3   (<0.8)  ug/mL














  10/14/19 Range/Units





  12:11 


 


WBC   (4.5-12.0)  X10-3/uL


 


RBC   (3.23-5.20)  x10(6)uL


 


Hgb   (11.5-15.5)  g/dL


 


Hct   (30.0-51.3)  %


 


MCV   (80-96)  fL


 


MCH   (27.7-33.6)  pg


 


MCHC   (32.2-35.4)  g/dL


 


RDW   (11.5-15.5)  %


 


Plt Count   (125-369)  X10(3)uL


 


MPV   (7.4-10.4)  fL


 


Add Manual Diff   


 


Neutrophils % (Manual)   (46-82)  %


 


Band Neutrophils %   (0-6)  %


 


Lymphocytes % (Manual)   (13-37)  %


 


Monocytes % (Manual)   (4-12)  %


 


Eosinophils % (Manual)   (0-5)  %


 


Toxic Granulation   (NOT SEEN)  


 


Sodium   (135-145)  mmol/L


 


Potassium   (3.5-5.3)  mmol/L


 


Chloride   (100-110)  mmol/L


 


Carbon Dioxide   (21-32)  mmol/L


 


BUN   (7-18)  mg/dL


 


Creatinine   (0.55-1.02)  mg/dL


 


Est Cr Clr Drug Dosing   mL/min


 


Estimated GFR (MDRD)   (>60)  


 


BUN/Creatinine Ratio   (9-20)  


 


Glucose   ()  mg/dL


 


POC Glucose  308 H  ()  mg/dL


 


Calcium   (8.6-10.2)  mg/dL


 


Urine HCG, Qual   (NEGATIVE)  


 


Vancomycin Trough   (<0.8)  ug/mL











Nick Results Last 24 Hours: 


 Microbiology











 10/12/19 16:32 Gram Stain - Final





 Drainage - Buttock, Left Routine Culture - Preliminary





    Gram Positive Cocci


 


 10/12/19 17:00 Aerobic Blood Culture - Preliminary





 Blood - Venous - Lab Draw    NO GROWTH AFTER 1 DAY





 Anaerobic Blood Culture - Preliminary





    NO GROWTH AFTER 1 DAY


 


 10/12/19 17:00 Aerobic Blood Culture - Preliminary





 Blood - Venous    NO GROWTH AFTER 1 DAY





 Anaerobic Blood Culture - Preliminary





    NO GROWTH AFTER 1 DAY


 


 10/12/19 17:20 Urine Culture - Preliminary





 Urine, Clean Catch    MIXED TAMERA DAY 1











Med Orders - Current: 


 Current Medications





Acetaminophen (Tylenol)  650 mg PO Q4H PRN


   PRN Reason: Pain (Mild 1-3)/fever


   Last Admin: 10/13/19 06:30 Dose:  650 mg


Ceftriaxone Sodium (Rocephin)  2 gm IVPUSH Q24H [facility]


Enoxaparin Sodium (Lovenox)  40 mg SUBCUT Q24H [facility]


   Last Admin: 10/13/19 17:31 Dose:  40 mg


Hydromorphone HCl (Dilaudid)  0.25 mg IVPUSH Q2H PRN


   PRN Reason: Pain (severe 7-10)


   Last Admin: 10/14/19 11:58 Dose:  0.25 mg


Hydroxyzine Pamoate (Vistaril)  50 - 100 mg PO BID PRN


   PRN Reason: Anxiety


Promethazine HCl 12.5 mg/ (Sodium Chloride)  50.5 mls @ 200 mls/hr IV Q6H PRN


   PRN Reason: Nausea/Vomiting


Sodium Chloride (Normal Saline)  1,000 mls @ 125 mls/hr IV ASDIRECTED [facility]


   Last Admin: 10/14/19 04:23 Dose:  125 mls/hr


Linezolid (Zyvox)  300 mls @ 300 mls/hr IV Q12H [facility]


   Last Admin: 10/14/19 11:58 Dose:  300 mls/hr


Insulin Glargine (Lantus Solostar)  50 units SUBCUT DAILY [facility]


   Last Admin: 10/14/19 08:08 Dose:  50 units


Insulin Human Lispro (Humalog)  0 unit SUBCUT TIDMEALS [facility]; Protocol


   Last Admin: 10/14/19 12:39 Dose:  4 units


Insulin Human Lispro (Humalog)  15 unit SUBCUT TIDMEALS [facility]


   Last Admin: 10/14/19 12:38 Dose:  15 units


Ketorolac Tromethamine (Toradol)  30 mg IVPUSH Q6H PRN


   PRN Reason: Pain (moderate 4-6)


   Last Admin: 10/14/19 09:09 Dose:  30 mg


Loperamide HCl (Imodium)  2 mg PO Q6H PRN


   PRN Reason: Diarrhea


   Last Admin: 10/14/19 10:18 Dose:  2 mg


Nicotine (Habitrol)  7 mg TRDERM DAILY [facility]


   Last Admin: 10/14/19 09:10 Dose:  Not Given


(Eszopiclone [ Eszopiclone] 3 Mg)* Pt Own Med*  3 mg PO BEDTIME PRN


   PRN Reason: Insomnia


Ondansetron HCl (Zofran Odt)  4 mg PO Q4H PRN


   PRN Reason: nausea, able to take PO


Pantoprazole Sodium (Protonix***)  40 mg PO ACBREAKFAST [facility]


   Last Admin: 10/14/19 09:10 Dose:  40 mg


Pregabalin (Lyrica)  300 mg PO BID [facility]


   Last Admin: 10/14/19 10:17 Dose:  300 mg


Saccharomyces Boulardii (Florastor)  250 mg PO BID [facility]


   Last Admin: 10/14/19 09:10 Dose:  250 mg


Sodium Chloride (Saline Flush)  10 ml FLUSH ASDIRECTED PRN


   PRN Reason: Keep Vein Open


   Last Admin: 10/14/19 11:59 Dose:  10 ml





Discontinued Medications





Ceftriaxone Sodium (Rocephin) Confirm Administered Dose 2 gm .ROUTE .STK-MED ONE


   Stop: 10/13/19 20:03


   Last Admin: 10/13/19 20:24 Dose:  Not Given


Gadoteridol (Prohance)  20 ml IV .AS DIRECTED ONE


   Stop: 10/14/19 11:04


   Last Admin: 10/14/19 11:48 Dose:  20 ml


Ciprofloxacin/Dextrose 400 mg/ (Premix)  200 mls @ 200 mls/hr IV Q12H [facility]


   Last Admin: 10/12/19 18:11 Dose:  200 mls/hr


Metronidazole 500 mg/ Premix  100 mls @ 100 mls/hr IV Q6H [facility]


   Last Admin: 10/12/19 22:16 Dose:  Not Given


Sodium Chloride (Normal Saline)  1,000 mls @ 999 mls/hr IV ONETIME ONE


   Stop: 10/12/19 20:07


   Last Admin: 10/12/19 19:19 Dose:  999 mls/hr


Ceftriaxone Sodium 2 gm/ (Sodium Chloride)  50 mls @ 200 mls/hr IV Q24H [facility]


   Last Admin: 10/13/19 20:24 Dose:  200 mls/hr


Vancomycin HCl 1 gm/ Sodium (Chloride)  250 mls @ 167 mls/hr IV Q12H [facility]


   Last Admin: 10/12/19 22:15 Dose:  Not Given


Vancomycin HCl 2 gm/ Sodium (Chloride)  250 mls @ 167 mls/hr IV Q12H [facility]


   Last Admin: 10/12/19 22:14 Dose:  Not Given


Vancomycin HCl 2 gm/ Sodium (Chloride)  500 mls @ 334.014 mls/hr IV Q12H [facility]


   Last Admin: 10/12/19 21:20 Dose:  334.014 mls/hr


Vancomycin HCl 2 gm/ Sodium (Chloride)  500 mls @ 250 mls/hr IV Q12H [facility]


   Last Admin: 10/14/19 10:50 Dose:  Not Given


Insulin Glargine (Lantus Solostar)  50 units SUBCUT DAILY [facility]


   Last Admin: 10/13/19 14:18 Dose:  50 units


Insulin Human Lispro (Humalog)  15 unit SUBCUT TIDMEALS [facility]


   Last Admin: 10/14/19 10:49 Dose:  Not Given


Insulin Human Regular (Humulin R)  5 unit IV ONETIME ONE


   Stop: 10/12/19 19:07


   Last Admin: 10/12/19 19:27 Dose:  5 units


Insulin Human Regular (Humulin R)  10 unit SUBCUT ONETIME ONE


   Stop: 10/12/19 22:10


   Last Admin: 10/12/19 22:54 Dose:  10 units


Iopamidol (Isovue-370 (76%))  100 ml IV ONETIME ONE


   Stop: 10/13/19 08:28


   Last Admin: 10/13/19 08:38 Dose:  100 ml











- Exam


General: Alert, Oriented, Cooperative, No Acute Distress


Lungs: Clear to Auscultation, Normal Respiratory Effort


Cardiovascular: Regular Rate, Regular Rhythm


GI/Abdominal Exam: Normal Bowel Sounds, Soft, Non-Tender, No Distention


Wound/Incisions: Drainage, Erythema Improving (erythema within gluteal fold, 

still induration in bilateral buttock)





- Problem List & Annotations


(1) Cellulitis and abscess of buttock


SNOMED Code(s): 152335599


   Code(s): L02.31 - CUTANEOUS ABSCESS OF BUTTOCK; L03.317 - CELLULITIS OF 

BUTTOCK   Status: Acute   Current Visit: Yes   





(2) Nausea & vomiting


SNOMED Code(s): 19891782


   Code(s): R11.2 - NAUSEA WITH VOMITING, UNSPECIFIED   Status: Resolved   

Current Visit: Yes   





(3) Fever


SNOMED Code(s): 517066379


   Code(s): R50.9 - FEVER, UNSPECIFIED   Status: Resolved   Current Visit: Yes 

  





(4) Fibromyalgia


SNOMED Code(s): 538595665


   Code(s): M79.7 - FIBROMYALGIA   Status: Acute   Current Visit: Yes   





(5) Diabetes mellitus with insulin therapy


SNOMED Code(s): 894206471


   Code(s): E11.9 - TYPE 2 DIABETES MELLITUS WITHOUT COMPLICATIONS; Z79.4 - 

LONG TERM (CURRENT) USE OF INSULIN   Status: Acute   Current Visit: Yes   





(6) Hypokalemia


SNOMED Code(s): 76551261


   Code(s): E87.6 - HYPOKALEMIA   Status: Acute   Current Visit: Yes   





- Problem List Review


Problem List Initiated/Reviewed/Updated: Yes





- My Orders


Last 24 Hours: 


My Active Orders





10/13/19 16:56


Loperamide [Imodium]   2 mg PO Q6H PRN 





10/13/19 19:03


STOOL CULTURE Routine 





10/13/19 21:00


Saccharomyces Boulardii [Florastor]   250 mg PO BID 





10/14/19 07:30


Pantoprazole [ProTONIX***]   40 mg PO ACBREAKFAST 





10/14/19 08:00


Pelvis w wo Cont [MR] Routine 





10/14/19 08:01


Insulin Lispro [HumaLOG]   15 unit SUBCUT TIDMEALS 





10/14/19 08:02


Insulin Glarg,Human.Rec.Analog [LantUS Solostar]   50 units SUBCUT DAILY 





10/14/19 10:00


Linezolid [Zyvox] 300 ml IV Q12H 














- Plan


Plan:: 


MRI today, results pending. Labs improved, WBC down to 15. Wound culture gram 

positive cocci. Vancomycin 2 grams did not get her to a therapeutic trough for 

skin infections so she was switched to Linezolid 600 mg IV bid. Blood cultures 

negative to date. Repeat labs tomorrow with CRP. Adjust treatments once we get 

MRI results. Potassium 3.1, will replace and recheck.

## 2019-10-15 RX ADMIN — Medication PRN ML: at 05:35

## 2019-10-15 RX ADMIN — POTASSIUM CHLORIDE SCH MEQ: 1500 TABLET, EXTENDED RELEASE ORAL at 20:44

## 2019-10-15 RX ADMIN — Medication PRN ML: at 18:23

## 2019-10-15 RX ADMIN — KETOROLAC TROMETHAMINE PRN MG: 30 INJECTION, SOLUTION INTRAMUSCULAR at 17:15

## 2019-10-15 RX ADMIN — METRONIDAZOLE SCH MLS/HR: 500 SOLUTION INTRAVENOUS at 00:20

## 2019-10-15 RX ADMIN — Medication SCH MG: at 08:24

## 2019-10-15 RX ADMIN — METRONIDAZOLE SCH MLS/HR: 500 SOLUTION INTRAVENOUS at 08:37

## 2019-10-15 RX ADMIN — HYDROMORPHONE HYDROCHLORIDE PRN MG: 2 INJECTION INTRAMUSCULAR; INTRAVENOUS; SUBCUTANEOUS at 02:28

## 2019-10-15 RX ADMIN — HYDROCODONE BITARTRATE AND ACETAMINOPHEN PRN TAB: 10; 325 TABLET ORAL at 14:22

## 2019-10-15 RX ADMIN — INSULIN LISPRO SCH UNITS: 100 INJECTION, SOLUTION INTRAVENOUS; SUBCUTANEOUS at 17:15

## 2019-10-15 RX ADMIN — INSULIN GLARGINE SCH UNITS: 100 INJECTION, SOLUTION SUBCUTANEOUS at 10:17

## 2019-10-15 RX ADMIN — INSULIN LISPRO SCH UNITS: 100 INJECTION, SOLUTION INTRAVENOUS; SUBCUTANEOUS at 10:18

## 2019-10-15 RX ADMIN — INSULIN LISPRO SCH UNITS: 100 INJECTION, SOLUTION INTRAVENOUS; SUBCUTANEOUS at 10:19

## 2019-10-15 RX ADMIN — Medication PRN ML: at 00:20

## 2019-10-15 RX ADMIN — HYDROCODONE BITARTRATE AND ACETAMINOPHEN PRN TAB: 10; 325 TABLET ORAL at 20:44

## 2019-10-15 RX ADMIN — INSULIN LISPRO SCH UNITS: 100 INJECTION, SOLUTION INTRAVENOUS; SUBCUTANEOUS at 12:02

## 2019-10-15 RX ADMIN — Medication SCH MG: at 20:44

## 2019-10-15 RX ADMIN — HYDROCODONE BITARTRATE AND ACETAMINOPHEN PRN TAB: 10; 325 TABLET ORAL at 08:23

## 2019-10-15 RX ADMIN — POTASSIUM CHLORIDE SCH MEQ: 1500 TABLET, EXTENDED RELEASE ORAL at 08:35

## 2019-10-15 RX ADMIN — KETOROLAC TROMETHAMINE PRN MG: 30 INJECTION, SOLUTION INTRAMUSCULAR at 04:22

## 2019-10-15 RX ADMIN — METRONIDAZOLE SCH MLS/HR: 500 SOLUTION INTRAVENOUS at 17:16

## 2019-10-15 RX ADMIN — Medication PRN ML: at 04:23

## 2019-10-15 RX ADMIN — Medication PRN ML: at 09:38

## 2019-10-15 RX ADMIN — CIPROFLOXACIN SCH MLS/HR: 2 INJECTION, SOLUTION INTRAVENOUS at 05:34

## 2019-10-15 RX ADMIN — Medication PRN ML: at 02:21

## 2019-10-15 RX ADMIN — KETOROLAC TROMETHAMINE PRN MG: 30 INJECTION, SOLUTION INTRAMUSCULAR at 10:54

## 2019-10-15 RX ADMIN — CIPROFLOXACIN SCH MLS/HR: 2 INJECTION, SOLUTION INTRAVENOUS at 18:20

## 2019-10-15 NOTE — PCM.PN
- General Info


Date of Service: 10/15/19


Admission Dx/Problem (Free Text): 


Patient had MRI yesterday which showed complex fistulas and abscess, Dr Loo examined patient and debrided area. Recommended shower/sitz baths 

twice a day, and follow up with surgery as outpatient. Discussed with her 

primary Dr Brannon, he recommended her following up with Dr Bajwa first 

before she sees him. Culture grew staph auricularis sensitive to ciprofloxacin, 

with perirectal fistulas changed Rocephin to Metronidazole. Her blood sugars 

are still >200s, Lantus at 50 units, Humalog 15 units tid with meals, and 11 

units with meals per sliding scale. Family is bringing food in for patient in 

addition to her meals. Afebrile, no nausea, vomiting, or diarrhea. Toradol is 

not helping for moderate pain.





- Patient Data


Vitals - Most Recent: 


 Last Vital Signs











Temp  36.7 C   10/14/19 20:00


 


Pulse  98   10/14/19 20:00


 


Resp  18   10/14/19 20:00


 


BP  112/68   10/14/19 20:00


 


Pulse Ox  97   10/14/19 20:00











Weight - Most Recent: 118.025 kg


I&O - Last 24 Hours: 


 Intake & Output











 10/14/19 10/15/19 10/15/19





 22:59 06:59 14:59


 


Intake Total  100 


 


Balance  100 











Lab Results Last 24 Hours: 


 Laboratory Results - last 24 hr











  10/14/19 10/14/19 10/14/19 Range/Units





  12:11 16:54 21:05 


 


WBC     (4.5-12.0)  X10-3/uL


 


RBC     (3.23-5.20)  x10(6)uL


 


Hgb     (11.5-15.5)  g/dL


 


Hct     (30.0-51.3)  %


 


MCV     (80-96)  fL


 


MCH     (27.7-33.6)  pg


 


MCHC     (32.2-35.4)  g/dL


 


RDW     (11.5-15.5)  %


 


Plt Count     (125-369)  X10(3)uL


 


MPV     (7.4-10.4)  fL


 


Add Manual Diff     


 


Neutrophils % (Manual)     (46-82)  %


 


Band Neutrophils %     (0-6)  %


 


Lymphocytes % (Manual)     (13-37)  %


 


Monocytes % (Manual)     (4-12)  %


 


Eosinophils % (Manual)     (0-5)  %


 


Toxic Granulation     (NOT SEEN)  


 


Sodium     (135-145)  mmol/L


 


Potassium     (3.5-5.3)  mmol/L


 


Chloride     (100-110)  mmol/L


 


Carbon Dioxide     (21-32)  mmol/L


 


BUN     (7-18)  mg/dL


 


Creatinine     (0.55-1.02)  mg/dL


 


Est Cr Clr Drug Dosing     mL/min


 


Estimated GFR (MDRD)     (>60)  


 


BUN/Creatinine Ratio     (9-20)  


 


Glucose     ()  mg/dL


 


POC Glucose  308 H  260 H  413 H* D  ()  mg/dL


 


Calcium     (8.6-10.2)  mg/dL


 


Magnesium     (1.8-2.5)  mg/dL


 


C-Reactive Protein     (0.5-0.9)  mg/dL














  10/14/19 10/14/19 10/15/19 Range/Units





  22:40 23:28 00:21 


 


WBC     (4.5-12.0)  X10-3/uL


 


RBC     (3.23-5.20)  x10(6)uL


 


Hgb     (11.5-15.5)  g/dL


 


Hct     (30.0-51.3)  %


 


MCV     (80-96)  fL


 


MCH     (27.7-33.6)  pg


 


MCHC     (32.2-35.4)  g/dL


 


RDW     (11.5-15.5)  %


 


Plt Count     (125-369)  X10(3)uL


 


MPV     (7.4-10.4)  fL


 


Add Manual Diff     


 


Neutrophils % (Manual)     (46-82)  %


 


Band Neutrophils %     (0-6)  %


 


Lymphocytes % (Manual)     (13-37)  %


 


Monocytes % (Manual)     (4-12)  %


 


Eosinophils % (Manual)     (0-5)  %


 


Toxic Granulation     (NOT SEEN)  


 


Sodium     (135-145)  mmol/L


 


Potassium     (3.5-5.3)  mmol/L


 


Chloride     (100-110)  mmol/L


 


Carbon Dioxide     (21-32)  mmol/L


 


BUN     (7-18)  mg/dL


 


Creatinine     (0.55-1.02)  mg/dL


 


Est Cr Clr Drug Dosing     mL/min


 


Estimated GFR (MDRD)     (>60)  


 


BUN/Creatinine Ratio     (9-20)  


 


Glucose     ()  mg/dL


 


POC Glucose  328 H D  276 H  289 H  ()  mg/dL


 


Calcium     (8.6-10.2)  mg/dL


 


Magnesium     (1.8-2.5)  mg/dL


 


C-Reactive Protein     (0.5-0.9)  mg/dL














  10/15/19 10/15/19 10/15/19 Range/Units





  05:40 06:15 06:15 


 


WBC   9.4   (4.5-12.0)  X10-3/uL


 


RBC   4.74   (3.23-5.20)  x10(6)uL


 


Hgb   12.9   (11.5-15.5)  g/dL


 


Hct   39.2   (30.0-51.3)  %


 


MCV   82.7   (80-96)  fL


 


MCH   27.3 L   (27.7-33.6)  pg


 


MCHC   33.0   (32.2-35.4)  g/dL


 


RDW   12.6   (11.5-15.5)  %


 


Plt Count   163   (125-369)  X10(3)uL


 


MPV   10.0   (7.4-10.4)  fL


 


Add Manual Diff   Yes   


 


Neutrophils % (Manual)   55   (46-82)  %


 


Band Neutrophils %   6   (0-6)  %


 


Lymphocytes % (Manual)   27   (13-37)  %


 


Monocytes % (Manual)   6   (4-12)  %


 


Eosinophils % (Manual)   6 H   (0-5)  %


 


Toxic Granulation   Few H   (NOT SEEN)  


 


Sodium    140  (135-145)  mmol/L


 


Potassium    3.1 L  (3.5-5.3)  mmol/L


 


Chloride    105  D  (100-110)  mmol/L


 


Carbon Dioxide    29  (21-32)  mmol/L


 


BUN    4 L  (7-18)  mg/dL


 


Creatinine    0.6  (0.55-1.02)  mg/dL


 


Est Cr Clr Drug Dosing    132.02  mL/min


 


Estimated GFR (MDRD)    > 60  (>60)  


 


BUN/Creatinine Ratio    6.7 L  (9-20)  


 


Glucose    240 H D  ()  mg/dL


 


POC Glucose  229 H    ()  mg/dL


 


Calcium    8.1 L  (8.6-10.2)  mg/dL


 


Magnesium     (1.8-2.5)  mg/dL


 


C-Reactive Protein     (0.5-0.9)  mg/dL














  10/15/19 10/15/19 Range/Units





  06:15 06:15 


 


WBC    (4.5-12.0)  X10-3/uL


 


RBC    (3.23-5.20)  x10(6)uL


 


Hgb    (11.5-15.5)  g/dL


 


Hct    (30.0-51.3)  %


 


MCV    (80-96)  fL


 


MCH    (27.7-33.6)  pg


 


MCHC    (32.2-35.4)  g/dL


 


RDW    (11.5-15.5)  %


 


Plt Count    (125-369)  X10(3)uL


 


MPV    (7.4-10.4)  fL


 


Add Manual Diff    


 


Neutrophils % (Manual)    (46-82)  %


 


Band Neutrophils %    (0-6)  %


 


Lymphocytes % (Manual)    (13-37)  %


 


Monocytes % (Manual)    (4-12)  %


 


Eosinophils % (Manual)    (0-5)  %


 


Toxic Granulation    (NOT SEEN)  


 


Sodium    (135-145)  mmol/L


 


Potassium    (3.5-5.3)  mmol/L


 


Chloride    (100-110)  mmol/L


 


Carbon Dioxide    (21-32)  mmol/L


 


BUN    (7-18)  mg/dL


 


Creatinine    (0.55-1.02)  mg/dL


 


Est Cr Clr Drug Dosing    mL/min


 


Estimated GFR (MDRD)    (>60)  


 


BUN/Creatinine Ratio    (9-20)  


 


Glucose    ()  mg/dL


 


POC Glucose    ()  mg/dL


 


Calcium    (8.6-10.2)  mg/dL


 


Magnesium   1.7 L  (1.8-2.5)  mg/dL


 


C-Reactive Protein  8.8 H*   (0.5-0.9)  mg/dL











Nick Results Last 24 Hours: 


 Microbiology











 10/12/19 17:00 Aerobic Blood Culture - Preliminary





 Blood - Venous - Lab Draw    NO GROWTH AFTER 2 DAYS





 Anaerobic Blood Culture - Preliminary





    NO GROWTH AFTER 2 DAYS


 


 10/12/19 17:00 Aerobic Blood Culture - Preliminary





 Blood - Venous    NO GROWTH AFTER 2 DAYS





 Anaerobic Blood Culture - Preliminary





    NO GROWTH AFTER 2 DAYS


 


 10/12/19 16:32 Gram Stain - Final





 Drainage - Buttock, Left Routine Culture - Final





    Staphylococcus Auricularis


 


 10/12/19 17:20 Urine Culture - Final





 Urine, Clean Catch    MIXED TAMERA DAY 2











Med Orders - Current: 


 Current Medications





Acetaminophen (Tylenol)  650 mg PO Q4H PRN


   PRN Reason: Pain (Mild 1-3)/fever


   Last Admin: 10/13/19 06:30 Dose:  650 mg


Hydrocodone Bitart/Acetaminophen (Norco 325-10 Mg)  1 tab PO Q6H PRN


   PRN Reason: Pain (moderate 4-6)


   Last Admin: 10/15/19 08:23 Dose:  1 tab


Enoxaparin Sodium (Lovenox)  40 mg SUBCUT Q24H [facility]


   Last Admin: 10/14/19 17:04 Dose:  40 mg


Hydromorphone HCl (Dilaudid)  0.25 mg IVPUSH Q2H PRN


   PRN Reason: Pain (severe 7-10)


   Last Admin: 10/15/19 02:28 Dose:  0.25 mg


Hydroxyzine Pamoate (Vistaril)  50 - 100 mg PO BID PRN


   PRN Reason: Anxiety


Promethazine HCl 12.5 mg/ (Sodium Chloride)  50.5 mls @ 200 mls/hr IV Q6H PRN


   PRN Reason: Nausea/Vomiting


Metronidazole 500 mg/ Premix  100 mls @ 100 mls/hr IV Q8H [facility]


   Last Admin: 10/15/19 08:37 Dose:  100 mls/hr


Ciprofloxacin/Dextrose 400 mg/ (Premix)  200 mls @ 200 mls/hr IV Q12H [facility]


   Last Admin: 10/15/19 05:34 Dose:  200 mls/hr


Insulin Glargine (Lantus Solostar)  50 units SUBCUT DAILY [facility]


   Last Admin: 10/15/19 10:17 Dose:  50 units


Insulin Human Lispro (Humalog)  0 unit SUBCUT TIDMEALS [facility]; Protocol


   Last Admin: 10/15/19 10:18 Dose:  6 units


Insulin Human Lispro (Humalog)  18 unit SUBCUT TIDMEALS [facility]


   Last Admin: 10/15/19 10:19 Dose:  18 units


Ketorolac Tromethamine (Toradol)  30 mg IVPUSH Q6H PRN


   PRN Reason: Pain (moderate 4-6)


   Last Admin: 10/15/19 04:22 Dose:  30 mg


Loperamide HCl (Imodium)  2 mg PO Q6H PRN


   PRN Reason: Diarrhea


   Last Admin: 10/14/19 10:18 Dose:  2 mg


Magnesium Chloride (Mag-64)  128 mg PO DAILY [facility]


Nicotine (Habitrol)  7 mg TRDERM DAILY [facility]


   Last Admin: 10/15/19 08:24 Dose:  Not Given


(Eszopiclone [ Eszopiclone] 3 Mg)* Pt Own Med*  3 mg PO BEDTIME PRN


   PRN Reason: Insomnia


Ondansetron HCl (Zofran Odt)  4 mg PO Q4H PRN


   PRN Reason: nausea, able to take PO


Pantoprazole Sodium (Protonix***)  40 mg PO ACBREAKFAST [facility]


   Last Admin: 10/15/19 06:57 Dose:  Not Given


Potassium Chloride (Klor-Con M20)  20 meq PO BID [facility]


   Last Admin: 10/15/19 08:35 Dose:  20 meq


Pregabalin (Lyrica)  300 mg PO BID [facility]


   Last Admin: 10/15/19 08:35 Dose:  300 mg


Saccharomyces Boulardii (Florastor)  250 mg PO BID [facility]


   Last Admin: 10/15/19 08:24 Dose:  250 mg


Sodium Chloride (Saline Flush)  10 ml FLUSH ASDIRECTED PRN


   PRN Reason: Keep Vein Open


   Last Admin: 10/15/19 09:38 Dose:  10 ml





Discontinued Medications





Ceftriaxone Sodium (Rocephin) Confirm Administered Dose 2 gm .ROUTE .STK-MED ONE


   Stop: 10/13/19 20:03


   Last Admin: 10/13/19 20:24 Dose:  Not Given


Ceftriaxone Sodium (Rocephin)  2 gm IVPUSH Q24H [facility]


Gadoteridol (Prohance)  20 ml IV .AS DIRECTED ONE


   Stop: 10/14/19 11:04


   Last Admin: 10/14/19 11:48 Dose:  20 ml


Sodium Chloride (Normal Saline)  1,000 mls @ 125 mls/hr IV ASDIRECTED [facility]


   Last Admin: 10/14/19 04:23 Dose:  125 mls/hr


Ciprofloxacin/Dextrose 400 mg/ (Premix)  200 mls @ 200 mls/hr IV Q12H [facility]


   Last Admin: 10/12/19 18:11 Dose:  200 mls/hr


Metronidazole 500 mg/ Premix  100 mls @ 100 mls/hr IV Q6H [facility]


   Last Admin: 10/12/19 22:16 Dose:  Not Given


Sodium Chloride (Normal Saline)  1,000 mls @ 999 mls/hr IV ONETIME ONE


   Stop: 10/12/19 20:07


   Last Admin: 10/12/19 19:19 Dose:  999 mls/hr


Ceftriaxone Sodium 2 gm/ (Sodium Chloride)  50 mls @ 200 mls/hr IV Q24H [facility]


   Last Admin: 10/13/19 20:24 Dose:  200 mls/hr


Vancomycin HCl 1 gm/ Sodium (Chloride)  250 mls @ 167 mls/hr IV Q12H [facility]


   Last Admin: 10/12/19 22:15 Dose:  Not Given


Vancomycin HCl 2 gm/ Sodium (Chloride)  250 mls @ 167 mls/hr IV Q12H [facility]


   Last Admin: 10/12/19 22:14 Dose:  Not Given


Vancomycin HCl 2 gm/ Sodium (Chloride)  500 mls @ 334.014 mls/hr IV Q12H [facility]


   Last Admin: 10/12/19 21:20 Dose:  334.014 mls/hr


Vancomycin HCl 2 gm/ Sodium (Chloride)  500 mls @ 250 mls/hr IV Q12H [facility]


   Last Admin: 10/14/19 10:50 Dose:  Not Given


Linezolid (Zyvox)  300 mls @ 300 mls/hr IV Q12H [facility]


   Last Admin: 10/14/19 11:58 Dose:  300 mls/hr


Insulin Glargine (Lantus Solostar)  50 units SUBCUT DAILY [facility]


   Last Admin: 10/13/19 14:18 Dose:  50 units


Insulin Human Lispro (Humalog)  15 unit SUBCUT TIDMEALS [facility]


   Last Admin: 10/14/19 10:49 Dose:  Not Given


Insulin Human Lispro (Humalog)  15 unit SUBCUT TIDMEALS [facility]


   Last Admin: 10/14/19 17:05 Dose:  15 units


Insulin Human Lispro (Humalog)  12 unit SUBCUT ONETIME STA


   Stop: 10/14/19 21:23


   Last Admin: 10/14/19 21:37 Dose:  12 units


Insulin Human Regular (Humulin R)  5 unit IV ONETIME ONE


   Stop: 10/12/19 19:07


   Last Admin: 10/12/19 19:27 Dose:  5 units


Insulin Human Regular (Humulin R)  10 unit SUBCUT ONETIME ONE


   Stop: 10/12/19 22:10


   Last Admin: 10/12/19 22:54 Dose:  10 units


Iopamidol (Isovue-370 (76%))  100 ml IV ONETIME ONE


   Stop: 10/13/19 08:28


   Last Admin: 10/13/19 08:38 Dose:  100 ml











- Exam


General: Alert, Oriented, Cooperative, No Acute Distress


Lungs: Clear to Auscultation, Normal Respiratory Effort


Cardiovascular: Regular Rate, Regular Rhythm


GI/Abdominal Exam: Normal Bowel Sounds, Soft, Non-Tender, No Distention


Wound/Incisions: Drainage (bloody, pulled 4 cm clot of opening.), Erythema 

Improving, Other (opening 2 cm on right buttock, granulation tissue. )





- Problem List & Annotations


(1) Cellulitis and abscess of buttock


SNOMED Code(s): 800074846


   Code(s): L02.31 - CUTANEOUS ABSCESS OF BUTTOCK; L03.317 - CELLULITIS OF 

BUTTOCK   Status: Acute   Current Visit: Yes   





(2) Nausea & vomiting


SNOMED Code(s): 17598209


   Code(s): R11.2 - NAUSEA WITH VOMITING, UNSPECIFIED   Status: Resolved   

Current Visit: Yes   





(3) Fever


SNOMED Code(s): 735170507


   Code(s): R50.9 - FEVER, UNSPECIFIED   Status: Resolved   Current Visit: Yes 

  





(4) Fibromyalgia


SNOMED Code(s): 989103795


   Code(s): M79.7 - FIBROMYALGIA   Status: Acute   Current Visit: Yes   





(5) Diabetes mellitus with insulin therapy


SNOMED Code(s): 374089923


   Code(s): E11.9 - TYPE 2 DIABETES MELLITUS WITHOUT COMPLICATIONS; Z79.4 - 

LONG TERM (CURRENT) USE OF INSULIN   Status: Acute   Current Visit: Yes   





(6) Hypokalemia


SNOMED Code(s): 18623443


   Code(s): E87.6 - HYPOKALEMIA   Status: Acute   Current Visit: Yes   





- Problem List Review


Problem List Initiated/Reviewed/Updated: Yes





- My Orders


Last 24 Hours: 


My Active Orders





10/14/19 16:00


metroNIDAZOLE/Normal Saline [Flagyl 500 MG in  ML] 500 mg   Premix Bag 1 

bag IV Q8H 





10/14/19 16:02


Consult to Physician [CONS] Routine 





10/14/19 16:03


Notify Provider Consults [RC] ASDIRECTED 





10/14/19 17:00


Ciprofloxacin in D5W [Cipro in D5W 400 MG/200 ML] 400 mg   Premix Bag 1 bag IV 

Q12H 





10/14/19 18:56


Communication Order [RC] 08,1600 





10/14/19 21:00


Potassium Chloride [Klor-Con M20]   20 meq PO BID 





10/15/19 07:25


Acetaminophen/HYDROcodone [Norco 325-10 MG]   1 tab PO Q6H PRN 





10/15/19 08:00


Insulin Lispro [HumaLOG]   18 unit SUBCUT TIDMEALS 





10/15/19 09:00


Magnesium Chloride [Mag-64]   128 mg PO DAILY 














- Plan


Plan:: 


WBC normal, CRP down to 8.8. Potassium unchanged from yesterday even with 20 

mEq bid, checked magnesium was 1.7 so ordered Mag-64 2 tab daily. Recheck labs 

tomorrow. Continue Ciprofloxacin/Metronidazole day 2. Shower/sitz baths. Add 

hydrocodone/APAP  mg q6h as needed to get her switched to oral pain 

medications to be able to transition home. Used 11 units of sliding scale at 

meals along with 15 units scheduled, increase Humalog 18 units TID with meals 

and keep Lantus at 50 units. Will adjust as needed.

## 2019-10-15 NOTE — [id number]
- General Info


Date of Service: 10/15/19


Admission Diagnosis/Problem: Abscess of buttock (Drained spontaneously)


Functional Status: Reports: Other (Pain better - generally feels better today)





- Patient Data


Vitals - Most Recent: 


 Last Vital Signs











Temp  97.8 F   10/15/19 12:00


 


Pulse  76   10/15/19 12:00


 


Resp  14   10/15/19 12:00


 


BP  106/64   10/15/19 12:00


 


Pulse Ox  95   10/15/19 12:00











Weight - Most Recent: 260 lb 3.2 oz


I&O - Last 24 Hours: 


 Intake & Output











 10/14/19 10/15/19 10/15/19





 22:59 06:59 14:59


 


Intake Total  100 


 


Balance  100 











Lab Results Last 24 Hrs: 


 Laboratory Results - last 24 hr











  10/14/19 10/14/19 10/14/19 Range/Units





  16:54 21:05 22:40 


 


WBC     (4.5-12.0)  X10-3/uL


 


RBC     (3.23-5.20)  x10(6)uL


 


Hgb     (11.5-15.5)  g/dL


 


Hct     (30.0-51.3)  %


 


MCV     (80-96)  fL


 


MCH     (27.7-33.6)  pg


 


MCHC     (32.2-35.4)  g/dL


 


RDW     (11.5-15.5)  %


 


Plt Count     (125-369)  X10(3)uL


 


MPV     (7.4-10.4)  fL


 


Add Manual Diff     


 


Neutrophils % (Manual)     (46-82)  %


 


Band Neutrophils %     (0-6)  %


 


Lymphocytes % (Manual)     (13-37)  %


 


Monocytes % (Manual)     (4-12)  %


 


Eosinophils % (Manual)     (0-5)  %


 


Toxic Granulation     (NOT SEEN)  


 


Sodium     (135-145)  mmol/L


 


Potassium     (3.5-5.3)  mmol/L


 


Chloride     (100-110)  mmol/L


 


Carbon Dioxide     (21-32)  mmol/L


 


BUN     (7-18)  mg/dL


 


Creatinine     (0.55-1.02)  mg/dL


 


Est Cr Clr Drug Dosing     mL/min


 


Estimated GFR (MDRD)     (>60)  


 


BUN/Creatinine Ratio     (9-20)  


 


Glucose     ()  mg/dL


 


POC Glucose  260 H  413 H* D  328 H D  ()  mg/dL


 


Calcium     (8.6-10.2)  mg/dL


 


Magnesium     (1.8-2.5)  mg/dL


 


C-Reactive Protein     (0.5-0.9)  mg/dL














  10/14/19 10/15/19 10/15/19 Range/Units





  23:28 00:21 05:40 


 


WBC     (4.5-12.0)  X10-3/uL


 


RBC     (3.23-5.20)  x10(6)uL


 


Hgb     (11.5-15.5)  g/dL


 


Hct     (30.0-51.3)  %


 


MCV     (80-96)  fL


 


MCH     (27.7-33.6)  pg


 


MCHC     (32.2-35.4)  g/dL


 


RDW     (11.5-15.5)  %


 


Plt Count     (125-369)  X10(3)uL


 


MPV     (7.4-10.4)  fL


 


Add Manual Diff     


 


Neutrophils % (Manual)     (46-82)  %


 


Band Neutrophils %     (0-6)  %


 


Lymphocytes % (Manual)     (13-37)  %


 


Monocytes % (Manual)     (4-12)  %


 


Eosinophils % (Manual)     (0-5)  %


 


Toxic Granulation     (NOT SEEN)  


 


Sodium     (135-145)  mmol/L


 


Potassium     (3.5-5.3)  mmol/L


 


Chloride     (100-110)  mmol/L


 


Carbon Dioxide     (21-32)  mmol/L


 


BUN     (7-18)  mg/dL


 


Creatinine     (0.55-1.02)  mg/dL


 


Est Cr Clr Drug Dosing     mL/min


 


Estimated GFR (MDRD)     (>60)  


 


BUN/Creatinine Ratio     (9-20)  


 


Glucose     ()  mg/dL


 


POC Glucose  276 H  289 H  229 H  ()  mg/dL


 


Calcium     (8.6-10.2)  mg/dL


 


Magnesium     (1.8-2.5)  mg/dL


 


C-Reactive Protein     (0.5-0.9)  mg/dL














  10/15/19 10/15/19 10/15/19 Range/Units





  06:15 06:15 06:15 


 


WBC  9.4    (4.5-12.0)  X10-3/uL


 


RBC  4.74    (3.23-5.20)  x10(6)uL


 


Hgb  12.9    (11.5-15.5)  g/dL


 


Hct  39.2    (30.0-51.3)  %


 


MCV  82.7    (80-96)  fL


 


MCH  27.3 L    (27.7-33.6)  pg


 


MCHC  33.0    (32.2-35.4)  g/dL


 


RDW  12.6    (11.5-15.5)  %


 


Plt Count  163    (125-369)  X10(3)uL


 


MPV  10.0    (7.4-10.4)  fL


 


Add Manual Diff  Yes    


 


Neutrophils % (Manual)  55    (46-82)  %


 


Band Neutrophils %  6    (0-6)  %


 


Lymphocytes % (Manual)  27    (13-37)  %


 


Monocytes % (Manual)  6    (4-12)  %


 


Eosinophils % (Manual)  6 H    (0-5)  %


 


Toxic Granulation  Few H    (NOT SEEN)  


 


Sodium   140   (135-145)  mmol/L


 


Potassium   3.1 L   (3.5-5.3)  mmol/L


 


Chloride   105  D   (100-110)  mmol/L


 


Carbon Dioxide   29   (21-32)  mmol/L


 


BUN   4 L   (7-18)  mg/dL


 


Creatinine   0.6   (0.55-1.02)  mg/dL


 


Est Cr Clr Drug Dosing   132.02   mL/min


 


Estimated GFR (MDRD)   > 60   (>60)  


 


BUN/Creatinine Ratio   6.7 L   (9-20)  


 


Glucose   240 H D   ()  mg/dL


 


POC Glucose     ()  mg/dL


 


Calcium   8.1 L   (8.6-10.2)  mg/dL


 


Magnesium     (1.8-2.5)  mg/dL


 


C-Reactive Protein    8.8 H*  (0.5-0.9)  mg/dL














  10/15/19 10/15/19 Range/Units





  06:15 11:59 


 


WBC    (4.5-12.0)  X10-3/uL


 


RBC    (3.23-5.20)  x10(6)uL


 


Hgb    (11.5-15.5)  g/dL


 


Hct    (30.0-51.3)  %


 


MCV    (80-96)  fL


 


MCH    (27.7-33.6)  pg


 


MCHC    (32.2-35.4)  g/dL


 


RDW    (11.5-15.5)  %


 


Plt Count    (125-369)  X10(3)uL


 


MPV    (7.4-10.4)  fL


 


Add Manual Diff    


 


Neutrophils % (Manual)    (46-82)  %


 


Band Neutrophils %    (0-6)  %


 


Lymphocytes % (Manual)    (13-37)  %


 


Monocytes % (Manual)    (4-12)  %


 


Eosinophils % (Manual)    (0-5)  %


 


Toxic Granulation    (NOT SEEN)  


 


Sodium    (135-145)  mmol/L


 


Potassium    (3.5-5.3)  mmol/L


 


Chloride    (100-110)  mmol/L


 


Carbon Dioxide    (21-32)  mmol/L


 


BUN    (7-18)  mg/dL


 


Creatinine    (0.55-1.02)  mg/dL


 


Est Cr Clr Drug Dosing    mL/min


 


Estimated GFR (MDRD)    (>60)  


 


BUN/Creatinine Ratio    (9-20)  


 


Glucose    ()  mg/dL


 


POC Glucose   267 H  ()  mg/dL


 


Calcium    (8.6-10.2)  mg/dL


 


Magnesium  1.7 L   (1.8-2.5)  mg/dL


 


C-Reactive Protein    (0.5-0.9)  mg/dL











Nick Results Last 24 Hrs: 


 Microbiology











 10/12/19 17:00 Aerobic Blood Culture - Preliminary





 Blood - Venous - Lab Draw    NO GROWTH AFTER 2 DAYS





 Anaerobic Blood Culture - Preliminary





    NO GROWTH AFTER 2 DAYS


 


 10/12/19 17:00 Aerobic Blood Culture - Preliminary





 Blood - Venous    NO GROWTH AFTER 2 DAYS





 Anaerobic Blood Culture - Preliminary





    NO GROWTH AFTER 2 DAYS


 


 10/12/19 16:32 Gram Stain - Final





 Drainage - Buttock, Left Routine Culture - Final





    Staphylococcus Auricularis


 


 10/12/19 17:20 Urine Culture - Final





 Urine, Clean Catch    MIXED TAMERA DAY 2











Med Orders - Current: 


 Current Medications





Acetaminophen (Tylenol)  650 mg PO Q4H PRN


   PRN Reason: Pain (Mild 1-3)/fever


   Last Admin: 10/13/19 06:30 Dose:  650 mg


Hydrocodone Bitart/Acetaminophen (Norco 325-10 Mg)  1 tab PO Q6H PRN


   PRN Reason: Pain (moderate 4-6)


   Last Admin: 10/15/19 08:23 Dose:  1 tab


Enoxaparin Sodium (Lovenox)  40 mg SUBCUT Q24H TRACE


   Last Admin: 10/14/19 17:04 Dose:  40 mg


Hydromorphone HCl (Dilaudid)  0.25 mg IVPUSH Q2H PRN


   PRN Reason: Pain (severe 7-10)


   Last Admin: 10/15/19 02:28 Dose:  0.25 mg


Hydroxyzine Pamoate (Vistaril)  50 - 100 mg PO BID PRN


   PRN Reason: Anxiety


Promethazine HCl 12.5 mg/ (Sodium Chloride)  50.5 mls @ 200 mls/hr IV Q6H PRN


   PRN Reason: Nausea/Vomiting


Metronidazole 500 mg/ Premix  100 mls @ 100 mls/hr IV Q8H [facility]


   Last Admin: 10/15/19 08:37 Dose:  100 mls/hr


Ciprofloxacin/Dextrose 400 mg/ (Premix)  200 mls @ 200 mls/hr IV Q12H [facility]


   Last Admin: 10/15/19 05:34 Dose:  200 mls/hr


Insulin Glargine (Lantus Solostar)  50 units SUBCUT DAILY [facility]


   Last Admin: 10/15/19 10:17 Dose:  50 units


Insulin Human Lispro (Humalog)  0 unit SUBCUT TIDMEALS [facility]; Protocol


   Last Admin: 10/15/19 12:02 Dose:  9 units


Insulin Human Lispro (Humalog)  18 unit SUBCUT TIDMEALS [facility]


   Last Admin: 10/15/19 12:02 Dose:  18 units


Ketorolac Tromethamine (Toradol)  30 mg IVPUSH Q6H PRN


   PRN Reason: Pain (moderate 4-6)


   Last Admin: 10/15/19 10:54 Dose:  30 mg


Loperamide HCl (Imodium)  2 mg PO Q6H PRN


   PRN Reason: Diarrhea


   Last Admin: 10/14/19 10:18 Dose:  2 mg


Magnesium Chloride (Mag-64)  128 mg PO DAILY [facility]


   Last Admin: 10/15/19 10:54 Dose:  128 mg


Nicotine (Habitrol)  7 mg TRDERM DAILY [facility]


   Last Admin: 10/15/19 08:24 Dose:  Not Given


(Eszopiclone [ Eszopiclone] 3 Mg)* Pt Own Med*  3 mg PO BEDTIME PRN


   PRN Reason: Insomnia


Ondansetron HCl (Zofran Odt)  4 mg PO Q4H PRN


   PRN Reason: nausea, able to take PO


Pantoprazole Sodium (Protonix***)  40 mg PO ACBREAKFAST [facility]


   Last Admin: 10/15/19 06:57 Dose:  Not Given


Potassium Chloride (Klor-Con M20)  20 meq PO BID [facility]


   Last Admin: 10/15/19 08:35 Dose:  20 meq


Pregabalin (Lyrica)  300 mg PO BID [facility]


   Last Admin: 10/15/19 08:35 Dose:  300 mg


Saccharomyces Boulardii (Florastor)  250 mg PO BID [facility]


   Last Admin: 10/15/19 08:24 Dose:  250 mg


Sodium Chloride (Saline Flush)  10 ml FLUSH ASDIRECTED PRN


   PRN Reason: Keep Vein Open


   Last Admin: 10/15/19 09:38 Dose:  10 ml





Discontinued Medications





Ceftriaxone Sodium (Rocephin) Confirm Administered Dose 2 gm .ROUTE .STK-MED ONE


   Stop: 10/13/19 20:03


   Last Admin: 10/13/19 20:24 Dose:  Not Given


Ceftriaxone Sodium (Rocephin)  2 gm IVPUSH Q24H [facility]


Gadoteridol (Prohance)  20 ml IV .AS DIRECTED ONE


   Stop: 10/14/19 11:04


   Last Admin: 10/14/19 11:48 Dose:  20 ml


Sodium Chloride (Normal Saline)  1,000 mls @ 125 mls/hr IV ASDIRECTED [facility]


   Last Admin: 10/14/19 04:23 Dose:  125 mls/hr


Ciprofloxacin/Dextrose 400 mg/ (Premix)  200 mls @ 200 mls/hr IV Q12H [facility]


   Last Admin: 10/12/19 18:11 Dose:  200 mls/hr


Metronidazole 500 mg/ Premix  100 mls @ 100 mls/hr IV Q6H [facility]


   Last Admin: 10/12/19 22:16 Dose:  Not Given


Sodium Chloride (Normal Saline)  1,000 mls @ 999 mls/hr IV ONETIME ONE


   Stop: 10/12/19 20:07


   Last Admin: 10/12/19 19:19 Dose:  999 mls/hr


Ceftriaxone Sodium 2 gm/ (Sodium Chloride)  50 mls @ 200 mls/hr IV Q24H [facility]


   Last Admin: 10/13/19 20:24 Dose:  200 mls/hr


Vancomycin HCl 1 gm/ Sodium (Chloride)  250 mls @ 167 mls/hr IV Q12H [facility]


   Last Admin: 10/12/19 22:15 Dose:  Not Given


Vancomycin HCl 2 gm/ Sodium (Chloride)  250 mls @ 167 mls/hr IV Q12H [facility]


   Last Admin: 10/12/19 22:14 Dose:  Not Given


Vancomycin HCl 2 gm/ Sodium (Chloride)  500 mls @ 334.014 mls/hr IV Q12H [facility]


   Last Admin: 10/12/19 21:20 Dose:  334.014 mls/hr


Vancomycin HCl 2 gm/ Sodium (Chloride)  500 mls @ 250 mls/hr IV Q12H [facility]


   Last Admin: 10/14/19 10:50 Dose:  Not Given


Linezolid (Zyvox)  300 mls @ 300 mls/hr IV Q12H [facility]


   Last Admin: 10/14/19 11:58 Dose:  300 mls/hr


Insulin Glargine (Lantus Solostar)  50 units SUBCUT DAILY [facility]


   Last Admin: 10/13/19 14:18 Dose:  50 units


Insulin Human Lispro (Humalog)  15 unit SUBCUT TIDMEALS [facility]


   Last Admin: 10/14/19 10:49 Dose:  Not Given


Insulin Human Lispro (Humalog)  15 unit SUBCUT TIDMEALS [facility]


   Last Admin: 10/14/19 17:05 Dose:  15 units


Insulin Human Lispro (Humalog)  12 unit SUBCUT ONETIME STA


   Stop: 10/14/19 21:23


   Last Admin: 10/14/19 21:37 Dose:  12 units


Insulin Human Regular (Humulin R)  5 unit IV ONETIME ONE


   Stop: 10/12/19 19:07


   Last Admin: 10/12/19 19:27 Dose:  5 units


Insulin Human Regular (Humulin R)  10 unit SUBCUT ONETIME ONE


   Stop: 10/12/19 22:10


   Last Admin: 10/12/19 22:54 Dose:  10 units


Iopamidol (Isovue-370 (76%))  100 ml IV ONETIME ONE


   Stop: 10/13/19 08:28


   Last Admin: 10/13/19 08:38 Dose:  100 ml











- Exam


Wound/Incisions: Erythema (less intense), Other (area still indurated but less 

tender, less purulent drainage although some blood clot today)


General: Alert, Oriented





- Problem List Review


Problem List Initiated/Reviewed/Updated: Yes





- My Orders


Last 24 Hours: 


 Active Orders 24 hr











 Category Date Time Status


 


 Communication Order [RC] 08,1600 Care  10/14/19 18:56 Active


 


 Notify Provider Consults [RC] ASDIRECTED Care  10/14/19 16:03 Active


 


 Consult to Physician [CONS] Routine Cons  10/14/19 16:02 Ordered


 


 BASIC METABOLIC PANEL,BMP [CHEM] Routine Lab  10/16/19 06:00 Ordered


 


 MAGNESIUM [CHEM] Routine Lab  10/16/19 06:00 Ordered


 


 Acetaminophen/HYDROcodone [Norco 325-10 MG] Med  10/15/19 07:25 Active





 1 tab PO Q6H PRN   


 


 Ciprofloxacin in D5W [Cipro in D5W 400 MG/200 ML] 400 Med  10/14/19 17:00 

Active





 mg   





 Premix Bag 1 bag   





 IV Q12H   


 


 Insulin Lispro [HumaLOG] Med  10/15/19 08:00 Active





 18 unit SUBCUT TIDMEALS   


 


 Magnesium Chloride [Mag-64] Med  10/15/19 09:00 Active





 128 mg PO DAILY   


 


 Potassium Chloride [Klor-Con M20] Med  10/14/19 21:00 Active





 20 meq PO BID   


 


 metroNIDAZOLE/Normal Saline [Flagyl 500 MG in  ML Med  10/14/19 16:00 

Active





 ] 500 mg   





 Premix Bag 1 bag   





 IV Q8H   








 Medication Orders





Acetaminophen (Tylenol)  650 mg PO Q4H PRN


   PRN Reason: Pain (Mild 1-3)/fever


   Last Admin: 10/13/19 06:30  Dose: 650 mg


Hydrocodone Bitart/Acetaminophen (Norco 325-10 Mg)  1 tab PO Q6H PRN


   PRN Reason: Pain (moderate 4-6)


   Last Admin: 10/15/19 08:23  Dose: 1 tab


Enoxaparin Sodium (Lovenox)  40 mg SUBCUT Q24H [facility]


   Last Admin: 10/14/19 17:04  Dose: 40 mg


   Admin: 10/13/19 17:31  Dose: 40 mg


   Admin: 10/12/19 18:20  Dose: 40 mg


Hydromorphone HCl (Dilaudid)  0.25 mg IVPUSH Q2H PRN


   PRN Reason: Pain (severe 7-10)


   Last Admin: 10/15/19 02:28  Dose: 0.25 mg


   Admin: 10/14/19 20:24  Dose: 0.25 mg


   Admin: 10/14/19 16:08  Dose: 0.25 mg


   Admin: 10/14/19 11:58  Dose: 0.25 mg


   Admin: 10/14/19 06:49  Dose: 0.25 mg


   Admin: 10/14/19 00:58  Dose: 0.25 mg


   Admin: 10/13/19 20:46  Dose: 0.25 mg


   Admin: 10/13/19 14:17  Dose: 0.25 mg


   Admin: 10/13/19 12:14  Dose: 0.25 mg


   Admin: 10/13/19 09:29  Dose: 0.25 mg


   Admin: 10/13/19 03:59  Dose: 0.25 mg


   Admin: 10/13/19 00:55  Dose: 0.25 mg


Hydroxyzine Pamoate (Vistaril)  50 - 100 mg PO BID PRN


   PRN Reason: Anxiety


Promethazine HCl 12.5 mg/ (Sodium Chloride)  50.5 mls @ 200 mls/hr IV Q6H PRN


   PRN Reason: Nausea/Vomiting


Metronidazole 500 mg/ Premix  100 mls @ 100 mls/hr IV Q8H [facility]


   Last Admin: 10/15/19 08:37  Dose: 100 mls/hr


   Infusion: 10/15/19 01:20  Dose: 100 mls/hr


   Admin: 10/15/19 00:20  Dose: 100 mls/hr


   Infusion: 10/14/19 17:55  Dose: 100 mls/hr


   Admin: 10/14/19 16:55  Dose: 100 mls/hr


Ciprofloxacin/Dextrose 400 mg/ (Premix)  200 mls @ 200 mls/hr IV Q12H [facility]


   Last Admin: 10/15/19 05:34  Dose: 200 mls/hr


   Infusion: 10/14/19 20:10  Dose: 200 mls/hr


   Admin: 10/14/19 19:10  Dose: 200 mls/hr


Insulin Glargine (Lantus Solostar)  50 units SUBCUT DAILY [facility]


   Last Admin: 10/15/19 10:17  Dose: 50 units


   Admin: 10/14/19 08:08  Dose: 50 units


Insulin Human Lispro (Humalog)  0 unit SUBCUT TIDMEALS [facility]; Protocol


   Last Admin: 10/15/19 12:02  Dose: 9 units


   Admin: 10/15/19 10:18  Dose: 6 units


   Admin: 10/14/19 17:05  Dose: 3 units


   Admin: 10/14/19 12:39  Dose: 4 units


   Admin: 10/14/19 08:08  Dose: 4 units


   Admin: 10/13/19 17:03  Dose: 4 units


   Admin: 10/13/19 12:18  Dose: 5 units


Insulin Human Lispro (Humalog)  18 unit SUBCUT TIDMEALS [facility]


   Last Admin: 10/15/19 12:02  Dose: 18 units


   Admin: 10/15/19 10:19  Dose: 18 units


Ketorolac Tromethamine (Toradol)  30 mg IVPUSH Q6H PRN


   PRN Reason: Pain (moderate 4-6)


   Last Admin: 10/15/19 10:54  Dose: 30 mg


   Admin: 10/15/19 04:22  Dose: 30 mg


   Admin: 10/14/19 21:34  Dose: 30 mg


   Admin: 10/14/19 15:34  Dose: 30 mg


   Admin: 10/14/19 09:09  Dose: 30 mg


   Admin: 10/14/19 00:59  Dose: 30 mg


   Admin: 10/13/19 16:58  Dose: 30 mg


   Admin: 10/13/19 06:29  Dose: 30 mg


   Admin: 10/12/19 22:26  Dose: 30 mg


Loperamide HCl (Imodium)  2 mg PO Q6H PRN


   PRN Reason: Diarrhea


   Last Admin: 10/14/19 10:18  Dose: 2 mg


   Admin: 10/13/19 19:21  Dose: 2 mg


Magnesium Chloride (Mag-64)  128 mg PO DAILY [facility]


   Last Admin: 10/15/19 10:54  Dose: 128 mg


Nicotine (Habitrol)  7 mg TRDERM DAILY [facility]


   Last Admin: 10/15/19 08:24 Dose:  Not Given


   Admin: 10/14/19 09:10 Dose:  Not Given


   Admin: 10/13/19 09:33 Dose:  Not Given


   Admin: 10/12/19 18:20 Dose:  Not Given


(Eszopiclone [ Eszopiclone] 3 Mg)* Pt Own Med*  3 mg PO BEDTIME PRN


   PRN Reason: Insomnia


Ondansetron HCl (Zofran Odt)  4 mg PO Q4H PRN


   PRN Reason: nausea, able to take PO


Pantoprazole Sodium (Protonix***)  40 mg PO ACBREAKFAST [facility]


   Last Admin: 10/15/19 06:57  Dose:  


   Admin: 10/15/19 05:36  Dose: 40 mg


   Admin: 10/14/19 09:10  Dose: 40 mg


Potassium Chloride (Klor-Con M20)  20 meq PO BID [facility]


   Last Admin: 10/15/19 08:35  Dose: 20 meq


   Admin: 10/14/19 20:32  Dose: 20 meq


Pregabalin (Lyrica)  300 mg PO BID [facility]


   Last Admin: 10/15/19 08:35  Dose: 300 mg


   Admin: 10/14/19 20:32  Dose: 300 mg


   Admin: 10/14/19 10:17  Dose: 300 mg


   Admin: 10/13/19 20:45  Dose: 300 mg


   Admin: 10/13/19 12:30  Dose: 300 mg


Saccharomyces Boulardii (Florastor)  250 mg PO BID [facility]


   Last Admin: 10/15/19 08:24  Dose: 250 mg


   Admin: 10/14/19 20:24  Dose: 250 mg


   Admin: 10/14/19 09:10  Dose: 250 mg


   Admin: 10/13/19 20:45  Dose: 250 mg


Sodium Chloride (Saline Flush)  10 ml FLUSH ASDIRECTED PRN


   PRN Reason: Keep Vein Open


   Last Admin: 10/15/19 09:38  Dose: 10 ml


   Admin: 10/15/19 05:35  Dose: 10 ml


   Admin: 10/15/19 04:23  Dose: 10 ml


   Admin: 10/15/19 02:21  Dose: 10 ml


   Admin: 10/15/19 00:20  Dose: 10 ml


   Admin: 10/14/19 21:34  Dose: 10 ml


   Admin: 10/14/19 20:26  Dose: 10 ml


   Admin: 10/14/19 18:07  Dose: 10 ml


   Admin: 10/14/19 11:59  Dose: 10 ml


   Admin: 10/14/19 09:11  Dose: 10 ml


   Admin: 10/13/19 12:17  Dose: 10 ml


   Admin: 10/12/19 22:28  Dose: 10 ml


   Admin: 10/12/19 22:27  Dose: 10 ml


   Admin: 10/12/19 18:22  Dose: 10 ml











- Assessment


Assessment           (Free Text/Narrative):: 





Perirectal abscess which spontaneously drained slowly improving


Diabetes





- Plan


Plan                        (Free Text/Narrative):: 





Continue current care

## 2019-10-16 RX ADMIN — INSULIN LISPRO SCH UNITS: 100 INJECTION, SOLUTION INTRAVENOUS; SUBCUTANEOUS at 11:35

## 2019-10-16 RX ADMIN — POTASSIUM CHLORIDE SCH MEQ: 1500 TABLET, EXTENDED RELEASE ORAL at 09:49

## 2019-10-16 RX ADMIN — Medication PRN ML: at 05:09

## 2019-10-16 RX ADMIN — INSULIN LISPRO SCH UNITS: 100 INJECTION, SOLUTION INTRAVENOUS; SUBCUTANEOUS at 18:28

## 2019-10-16 RX ADMIN — METRONIDAZOLE SCH MLS/HR: 500 SOLUTION INTRAVENOUS at 09:34

## 2019-10-16 RX ADMIN — METRONIDAZOLE SCH MLS/HR: 500 SOLUTION INTRAVENOUS at 00:07

## 2019-10-16 RX ADMIN — HYDROCODONE BITARTRATE AND ACETAMINOPHEN PRN TAB: 10; 325 TABLET ORAL at 20:56

## 2019-10-16 RX ADMIN — Medication PRN ML: at 00:08

## 2019-10-16 RX ADMIN — Medication SCH MG: at 09:59

## 2019-10-16 RX ADMIN — Medication PRN ML: at 09:34

## 2019-10-16 RX ADMIN — KETOROLAC TROMETHAMINE PRN MG: 30 INJECTION, SOLUTION INTRAMUSCULAR at 07:37

## 2019-10-16 RX ADMIN — INSULIN LISPRO SCH UNITS: 100 INJECTION, SOLUTION INTRAVENOUS; SUBCUTANEOUS at 09:46

## 2019-10-16 RX ADMIN — INSULIN LISPRO SCH: 100 INJECTION, SOLUTION INTRAVENOUS; SUBCUTANEOUS at 10:02

## 2019-10-16 RX ADMIN — POTASSIUM CHLORIDE SCH MEQ: 1500 TABLET, EXTENDED RELEASE ORAL at 21:01

## 2019-10-16 RX ADMIN — HYDROCODONE BITARTRATE AND ACETAMINOPHEN PRN TAB: 10; 325 TABLET ORAL at 05:17

## 2019-10-16 RX ADMIN — Medication PRN ML: at 17:16

## 2019-10-16 RX ADMIN — Medication SCH MG: at 09:48

## 2019-10-16 RX ADMIN — HYDROMORPHONE HYDROCHLORIDE PRN MG: 2 INJECTION INTRAMUSCULAR; INTRAVENOUS; SUBCUTANEOUS at 17:15

## 2019-10-16 RX ADMIN — KETOROLAC TROMETHAMINE PRN MG: 30 INJECTION, SOLUTION INTRAMUSCULAR at 00:13

## 2019-10-16 RX ADMIN — INSULIN GLARGINE SCH UNITS: 100 INJECTION, SOLUTION SUBCUTANEOUS at 09:50

## 2019-10-16 RX ADMIN — Medication PRN ML: at 07:37

## 2019-10-16 RX ADMIN — CIPROFLOXACIN SCH MLS/HR: 2 INJECTION, SOLUTION INTRAVENOUS at 05:09

## 2019-10-16 RX ADMIN — HYDROCODONE BITARTRATE AND ACETAMINOPHEN PRN TAB: 10; 325 TABLET ORAL at 13:30

## 2019-10-16 RX ADMIN — Medication SCH MG: at 21:00

## 2019-10-16 RX ADMIN — INSULIN LISPRO SCH UNITS: 100 INJECTION, SOLUTION INTRAVENOUS; SUBCUTANEOUS at 18:27

## 2019-10-16 NOTE — PCM.PN
- General Info


Date of Service: 10/16/19


Admission Dx/Problem (Free Text): 


Patient having less drainage since debridement and sitz baths. Pain is 

improving with hydrocodone/APAP. No nausea, vomiting or diarrhea. Urinary 

frequency has resolved.





- Patient Data


Vitals - Most Recent: 


 Last Vital Signs











Temp  97.8 F   10/16/19 12:30


 


Pulse  65   10/16/19 12:30


 


Resp  20   10/16/19 12:30


 


BP  92/53 L  10/16/19 12:30


 


Pulse Ox  97   10/16/19 12:30











Weight - Most Recent: 260 lb 3.2 oz


I&O - Last 24 Hours: 


 Intake & Output











 10/16/19 10/16/19 10/16/19





 06:59 14:59 22:59


 


Intake Total  100 


 


Balance  100 











Lab Results Last 24 Hours: 


 Laboratory Results - last 24 hr











  10/15/19 10/16/19 10/16/19 Range/Units





  20:43 06:45 09:42 


 


Sodium   140   (135-145)  mmol/L


 


Potassium   3.6   (3.5-5.3)  mmol/L


 


Chloride   104   (100-110)  mmol/L


 


Carbon Dioxide   30   (21-32)  mmol/L


 


BUN   5 L   (7-18)  mg/dL


 


Creatinine   0.6   (0.55-1.02)  mg/dL


 


Est Cr Clr Drug Dosing   132.02   mL/min


 


Estimated GFR (MDRD)   > 60   (>60)  


 


BUN/Creatinine Ratio   8.3 L   (9-20)  


 


Glucose   316 H   ()  mg/dL


 


POC Glucose  295 H D   314 H  ()  mg/dL


 


Calcium   8.3 L   (8.6-10.2)  mg/dL


 


Magnesium   1.6 L   (1.8-2.5)  mg/dL














  10/16/19 10/16/19 Range/Units





  11:30 17:23 


 


Sodium    (135-145)  mmol/L


 


Potassium    (3.5-5.3)  mmol/L


 


Chloride    (100-110)  mmol/L


 


Carbon Dioxide    (21-32)  mmol/L


 


BUN    (7-18)  mg/dL


 


Creatinine    (0.55-1.02)  mg/dL


 


Est Cr Clr Drug Dosing    mL/min


 


Estimated GFR (MDRD)    (>60)  


 


BUN/Creatinine Ratio    (9-20)  


 


Glucose    ()  mg/dL


 


POC Glucose  363 H  254 H D  ()  mg/dL


 


Calcium    (8.6-10.2)  mg/dL


 


Magnesium    (1.8-2.5)  mg/dL











Nick Results Last 24 Hours: 


 Microbiology











 10/12/19 17:00 Aerobic Blood Culture - Preliminary





 Blood - Venous - Lab Draw    NO GROWTH AFTER 4 DAYS





 Anaerobic Blood Culture - Preliminary





    NO GROWTH AFTER 4 DAYS


 


 10/12/19 17:00 Aerobic Blood Culture - Preliminary





 Blood - Venous    NO GROWTH AFTER 4 DAYS





 Anaerobic Blood Culture - Preliminary





    NO GROWTH AFTER 4 DAYS


 


 10/13/19 19:03 Salmonella/Shigella Screen - Final





 Stool / Feces Escherichia coli Shiga Toxins EIA - Final











Med Orders - Current: 


 Current Medications





Acetaminophen (Tylenol)  650 mg PO Q4H PRN


   PRN Reason: Pain (Mild 1-3)/fever


   Last Admin: 10/16/19 11:40 Dose:  650 mg


Hydrocodone Bitart/Acetaminophen (Norco 325-10 Mg)  1 tab PO Q6H PRN


   PRN Reason: Pain (moderate 4-6)


   Last Admin: 10/16/19 13:30 Dose:  1 tab


Ciprofloxacin (Ciprofloxacin Hcl)  500 mg PO BID [facility]


Enoxaparin Sodium (Lovenox)  40 mg SUBCUT Q24H [facility]


   Last Admin: 10/16/19 17:19 Dose:  40 mg


Hydromorphone HCl (Dilaudid)  0.25 mg IVPUSH Q2H PRN


   PRN Reason: Pain (severe 7-10)


   Last Admin: 10/16/19 17:15 Dose:  0.25 mg


Hydroxyzine Pamoate (Vistaril)  50 - 100 mg PO BID PRN


   PRN Reason: Anxiety


Promethazine HCl 12.5 mg/ (Sodium Chloride)  50.5 mls @ 200 mls/hr IV Q6H PRN


   PRN Reason: Nausea/Vomiting


Insulin Glargine (Lantus Solostar)  50 units SUBCUT DAILY [facility]


   Last Admin: 10/16/19 09:50 Dose:  50 units


Insulin Human Lispro (Humalog)  0 unit SUBCUT TIDMEALS [facility]; Protocol


   Last Admin: 10/16/19 11:35 Dose:  15 units


Insulin Human Lispro (Humalog)  20 unit SUBCUT TIDMEALS [facility]


   Last Admin: 10/16/19 11:35 Dose:  20 units


Ketorolac Tromethamine (Toradol)  30 mg IVPUSH Q6H PRN


   PRN Reason: Pain (moderate 4-6)


   Last Admin: 10/16/19 07:37 Dose:  30 mg


Loperamide HCl (Imodium)  2 mg PO Q6H PRN


   PRN Reason: Diarrhea


   Last Admin: 10/14/19 10:18 Dose:  2 mg


Magnesium Chloride (Mag-64)  192 mg PO DAILY [facility]


   Last Admin: 10/16/19 09:59 Dose:  192 mg


Metronidazole (Flagyl)  500 mg PO TID [facility]


   Last Admin: 10/16/19 13:32 Dose:  500 mg


Nicotine (Habitrol)  7 mg TRDERM DAILY [facility]


   Last Admin: 10/16/19 09:49 Dose:  Not Given


(Eszopiclone [ Eszopiclone] 3 Mg)* Pt Own Med*  3 mg PO BEDTIME PRN


   PRN Reason: Insomnia


Ondansetron HCl (Zofran Odt)  4 mg PO Q4H PRN


   PRN Reason: nausea, able to take PO


Pantoprazole Sodium (Protonix***)  40 mg PO ACBREAKFAST [facility]


   Last Admin: 10/16/19 07:40 Dose:  40 mg


Potassium Chloride (Klor-Con M20)  20 meq PO BID [facility]


   Last Admin: 10/16/19 09:49 Dose:  20 meq


Pregabalin (Lyrica)  300 mg PO BID [facility]


   Last Admin: 10/16/19 09:59 Dose:  300 mg


Saccharomyces Boulardii (Florastor)  250 mg PO BID [facility]


   Last Admin: 10/16/19 09:48 Dose:  250 mg


Sodium Chloride (Saline Flush)  10 ml FLUSH ASDIRECTED PRN


   PRN Reason: Keep Vein Open


   Last Admin: 10/16/19 17:16 Dose:  10 ml





Discontinued Medications





Ceftriaxone Sodium (Rocephin) Confirm Administered Dose 2 gm .ROUTE .STK-MED ONE


   Stop: 10/13/19 20:03


   Last Admin: 10/13/19 20:24 Dose:  Not Given


Ceftriaxone Sodium (Rocephin)  2 gm IVPUSH Q24H [facility]


Gadoteridol (Prohance)  20 ml IV .AS DIRECTED ONE


   Stop: 10/14/19 11:04


   Last Admin: 10/14/19 11:48 Dose:  20 ml


Sodium Chloride (Normal Saline)  1,000 mls @ 125 mls/hr IV ASDIRECTED [facility]


   Last Admin: 10/14/19 04:23 Dose:  125 mls/hr


Ciprofloxacin/Dextrose 400 mg/ (Premix)  200 mls @ 200 mls/hr IV Q12H [facility]


   Last Admin: 10/12/19 18:11 Dose:  200 mls/hr


Metronidazole 500 mg/ Premix  100 mls @ 100 mls/hr IV Q6H [facility]


   Last Admin: 10/12/19 22:16 Dose:  Not Given


Sodium Chloride (Normal Saline)  1,000 mls @ 999 mls/hr IV ONETIME ONE


   Stop: 10/12/19 20:07


   Last Admin: 10/12/19 19:19 Dose:  999 mls/hr


Ceftriaxone Sodium 2 gm/ (Sodium Chloride)  50 mls @ 200 mls/hr IV Q24H [facility]


   Last Admin: 10/13/19 20:24 Dose:  200 mls/hr


Vancomycin HCl 1 gm/ Sodium (Chloride)  250 mls @ 167 mls/hr IV Q12H [facility]


   Last Admin: 10/12/19 22:15 Dose:  Not Given


Vancomycin HCl 2 gm/ Sodium (Chloride)  250 mls @ 167 mls/hr IV Q12H [facility]


   Last Admin: 10/12/19 22:14 Dose:  Not Given


Vancomycin HCl 2 gm/ Sodium (Chloride)  500 mls @ 334.014 mls/hr IV Q12H [facility]


   Last Admin: 10/12/19 21:20 Dose:  334.014 mls/hr


Vancomycin HCl 2 gm/ Sodium (Chloride)  500 mls @ 250 mls/hr IV Q12H [facility]


   Last Admin: 10/14/19 10:50 Dose:  Not Given


Linezolid (Zyvox)  300 mls @ 300 mls/hr IV Q12H [facility]


   Last Admin: 10/14/19 11:58 Dose:  300 mls/hr


Metronidazole 500 mg/ Premix  100 mls @ 100 mls/hr IV Q8H [facility]


   Stop: 10/16/19 12:00


   Last Admin: 10/16/19 09:34 Dose:  100 mls/hr


Ciprofloxacin/Dextrose 400 mg/ (Premix)  200 mls @ 200 mls/hr IV Q12H [facility]


   Last Admin: 10/16/19 05:09 Dose:  200 mls/hr


Insulin Glargine (Lantus Solostar)  50 units SUBCUT DAILY [facility]


   Last Admin: 10/13/19 14:18 Dose:  50 units


Insulin Human Lispro (Humalog)  15 unit SUBCUT TIDMEALS [facility]


   Last Admin: 10/14/19 10:49 Dose:  Not Given


Insulin Human Lispro (Humalog)  15 unit SUBCUT TIDMEALS [facility]


   Last Admin: 10/14/19 17:05 Dose:  15 units


Insulin Human Lispro (Humalog)  12 unit SUBCUT ONETIME STA


   Stop: 10/14/19 21:23


   Last Admin: 10/14/19 21:37 Dose:  12 units


Insulin Human Lispro (Humalog)  18 unit SUBCUT TIDMEALS [facility]


   Last Admin: 10/16/19 10:02 Dose:  Not Given


Insulin Human Regular (Humulin R)  5 unit IV ONETIME ONE


   Stop: 10/12/19 19:07


   Last Admin: 10/12/19 19:27 Dose:  5 units


Insulin Human Regular (Humulin R)  10 unit SUBCUT ONETIME ONE


   Stop: 10/12/19 22:10


   Last Admin: 10/12/19 22:54 Dose:  10 units


Iopamidol (Isovue-370 (76%))  100 ml IV ONETIME ONE


   Stop: 10/13/19 08:28


   Last Admin: 10/13/19 08:38 Dose:  100 ml


Magnesium Chloride (Mag-64)  128 mg PO DAILY [facility]


   Last Admin: 10/15/19 10:54 Dose:  128 mg











- Exam


General: Alert, Oriented, Cooperative, No Acute Distress


Lungs: Clear to Auscultation, Normal Respiratory Effort


Cardiovascular: Regular Rate, Regular Rhythm


GI/Abdominal Exam: Normal Bowel Sounds, Soft, Non-Tender, No Distention





- Problem List & Annotations


(1) Cellulitis and abscess of buttock


SNOMED Code(s): 078535385


   Code(s): L02.31 - CUTANEOUS ABSCESS OF BUTTOCK; L03.317 - CELLULITIS OF 

BUTTOCK   Status: Acute   Current Visit: Yes   





(2) Nausea & vomiting


SNOMED Code(s): 08157654


   Code(s): R11.2 - NAUSEA WITH VOMITING, UNSPECIFIED   Status: Resolved   

Current Visit: Yes   





(3) Fever


SNOMED Code(s): 490317703


   Code(s): R50.9 - FEVER, UNSPECIFIED   Status: Resolved   Current Visit: Yes 

  





(4) Fibromyalgia


SNOMED Code(s): 690535925


   Code(s): M79.7 - FIBROMYALGIA   Status: Acute   Current Visit: Yes   





(5) Diabetes mellitus with insulin therapy


SNOMED Code(s): 635637077


   Code(s): E11.9 - TYPE 2 DIABETES MELLITUS WITHOUT COMPLICATIONS; Z79.4 - 

LONG TERM (CURRENT) USE OF INSULIN   Status: Acute   Current Visit: Yes   





(6) Hypokalemia


SNOMED Code(s): 28436647


   Code(s): E87.6 - HYPOKALEMIA   Status: Acute   Current Visit: Yes   





- Problem List Review


Problem List Initiated/Reviewed/Updated: Yes





- My Orders


Last 24 Hours: 


My Active Orders





10/16/19 09:00


Magnesium Chloride [Mag-64]   192 mg PO DAILY 





10/16/19 12:00


Insulin Lispro [HumaLOG]   20 unit SUBCUT TIDMEALS 





10/16/19 14:00


metroNIDAZOLE [Flagyl]   500 mg PO TID 





10/16/19 21:00


Ciprofloxacin [Ciprofloxacin HCl]   500 mg PO BID 














- Plan


Plan:: 


Patient having itching with Cipro IV at IV site so will switch to oral cipro 

this evening and Metronidazole oral this afternoon. Not requiring any Dilaudid 

for pain. hope to send home tomorrow. Meal time insulin increased to 20 units 

daily and high dose sliding scale, required 21 units of sliding scale in 

addition to 18 units with meals yesterday. Dr Bajwa is on vacation so will 

have patient follow up with Dr Loo on Friday as outpatient. Follow up 

with Dr Brannon for her diabetes.

## 2019-10-17 VITALS — SYSTOLIC BLOOD PRESSURE: 126 MMHG | HEART RATE: 71 BPM | DIASTOLIC BLOOD PRESSURE: 67 MMHG

## 2019-10-17 RX ADMIN — Medication SCH MG: at 08:47

## 2019-10-17 RX ADMIN — HYDROCODONE BITARTRATE AND ACETAMINOPHEN PRN TAB: 10; 325 TABLET ORAL at 05:39

## 2019-10-17 RX ADMIN — KETOROLAC TROMETHAMINE PRN MG: 30 INJECTION, SOLUTION INTRAMUSCULAR at 09:00

## 2019-10-17 RX ADMIN — KETOROLAC TROMETHAMINE PRN MG: 30 INJECTION, SOLUTION INTRAMUSCULAR at 01:08

## 2019-10-17 RX ADMIN — Medication PRN ML: at 09:00

## 2019-10-17 RX ADMIN — Medication SCH MG: at 08:48

## 2019-10-17 RX ADMIN — POTASSIUM CHLORIDE SCH MEQ: 1500 TABLET, EXTENDED RELEASE ORAL at 08:47

## 2019-10-17 RX ADMIN — INSULIN GLARGINE SCH UNITS: 100 INJECTION, SOLUTION SUBCUTANEOUS at 08:50

## 2019-10-17 RX ADMIN — INSULIN LISPRO SCH UNITS: 100 INJECTION, SOLUTION INTRAVENOUS; SUBCUTANEOUS at 08:51

## 2019-10-17 RX ADMIN — Medication PRN ML: at 01:09

## 2019-10-17 NOTE — PCM.DCSUM1
**Discharge Summary





- Hospital Course


HPI Initial Comments: 


Patient noted pain with sitting and green foul drainage from right more than 

left buttock starting on Thursday, she refused to come to the doctor on 

Thursday when her  advised her to. She has been having fevers, chills, 

nausea, vomiting, but no diarrhea. Last emesis was last night. She has had 

increased frequency but no dysuria or hematuria. She has not done any sitz 

baths or applied any topical antibiotics. Rates pain 10/10. Was seen in [facility] 

clinic today and when provider examined, called me for admission. She was 

afebrile in the clinic but tachycardiac. When she arrived to floor she was 

febrile, 101. She has been having cold symptoms for the past week but has not 

taken any treatments. She is Diabetic that has Tresiba, Novolog and Janumet but 

has not been taking her medications. She knows that her uncontrolled sugars put 

her at risk of infections. She also report being achy all over. No dizziness or 

lightheadedness. Also was treated for fungal infection on her feet with oral 

and topical medication(not sure of the names), oral for 4 weeks but did not 

change the rash. 





Diagnosis: Stroke: No





- Discharge Data


Discharge Date: 10/17/19


Discharge Disposition: Home, Self-Care 01


Condition: Good





- Referral to Home Health


Primary Care Physician: 


Dheeraj Brannon MD








- Discharge Diagnosis/Problem(s)


(1) Cellulitis and abscess of buttock


SNOMED Code(s): 255463576


   ICD Code: L02.31 - CUTANEOUS ABSCESS OF BUTTOCK; L03.317 - CELLULITIS OF 

BUTTOCK   Status: Acute   





(2) Nausea & vomiting


SNOMED Code(s): 19322146


   ICD Code: R11.2 - NAUSEA WITH VOMITING, UNSPECIFIED   Status: Resolved   





(3) Fever


SNOMED Code(s): 624776135


   ICD Code: R50.9 - FEVER, UNSPECIFIED   Status: Resolved   





(4) Fibromyalgia


SNOMED Code(s): 718771818


   ICD Code: M79.7 - FIBROMYALGIA   Status: Chronic   





(5) Diabetes mellitus with insulin therapy


SNOMED Code(s): 071026163


   ICD Code: E11.9 - TYPE 2 DIABETES MELLITUS WITHOUT COMPLICATIONS; Z79.4 - 

LONG TERM (CURRENT) USE OF INSULIN   Status: Chronic   





(6) Hypokalemia


SNOMED Code(s): 30330345


   ICD Code: E87.6 - HYPOKALEMIA   Status: Resolved   





- Patient Summary/Data


Consults: 


 Consultations





10/14/19 16:02


Consult to Physician [CONS] Routine 


   Consulting Provider: Quang Loo Call Completed to Consulting Physician: Yes: dr alicea called 

and spoke with provider


   Reason for Consult: perirectal abscess and fistula











Hospital Course: 


Patient was admitted for cellulitis and early sepsis, initially started on 

Ciprofloxacin and Metronidazole for possibly perirectal abscess. WBC was 20,000 

with CRP 20. She was switched Saturday night to Rocephin & Vancomycin while 

awaiting wound cultures. She was improving, had no further fevers except day of 

admission, right buttock area was draining. Spoke with Dr Mohs on-call surgeon 

on Sunday who felt since it was draining that she did not require surgery at 

this time. CT was done that showed it was in the soft tissues but not muscle. 

No focal fluid collection to drain but could not rule out a fistula so MRI was 

done. MRI showed complex fistulas and abscess so Dr Loo on call surgeon 

on Monday, he did debridement and ordered shower/sitz bath bid. Wound culture 

came back staph auricularis sensitive to Ciprofloxacin so Rocephin was 

discontinued. Due to perirectal fistula she was also changed to Metronidazole. 

Earlier on Monday her vancomycin trough came back at 5 and she was at the high 

dose based on her weight so she had been switched to Linezolid prior to culture 

results that were in the afternoon. She had 3 days of Ciprofloxacin and 

Metronidazole at discharge. She was switched to oral forms on Weds prior to 

discharge to make sure she tolerated them. Her psychiatric medications were 

held as they all interacted with QT prolongation with her antibiotics. She has 

been off of them for about 2 weeks prior to admission. She was also 

transitioned to oral pain medications and had not been using Dilaudid since 

Tuesday morning. Her Metformin was held after contrast with both CT on Sunday 

and MRI on Monday. Her meal time insulin was increased to 20 units tid and used 

10-20 units of sliding scale on top of it. 








- Patient Instructions


Diet: Diabetic Diet


Activity: As Tolerated


Driving: [place] Drive Today


Showering/Bathing: May Shower


Notify Provider of: Fever, Increased Pain, Swelling and Redness, Drainage, 

Nausea and/or Vomiting


Other/Special Instructions: Follow up with Dr Loo Friday Oct 18th at 

4pm.  Follow up with Dr Brannon in 1 week for your diabetes & restarting your 

topiramate.  Follow up with Dr Ayala after antibiotics completed to restart 

your medications that were held.





- Discharge Plan


*PRESCRIPTION DRUG MONITORING PROGRAM REVIEWED*: Yes


*COPY OF PRESCRIPTION DRUG MONITORING REPORT IN PATIENT BERNICE: Yes


Prescriptions/Med Rec: 


Acetaminophen/HYDROcodone [Norco 325-10 MG] 1 tab PO Q6H PRN 7 Days #28 tablet


 PRN Reason: Pain (Moderate 4-6)


Ciprofloxacin [Ciprofloxacin HCl] 500 mg PO BID 11 Days #22 tablet


metroNIDAZOLE [Flagyl] 500 mg PO TID 11 Days #33 tablet


Home Medications: 


 Home Meds





Ibuprofen [Motrin] 600 mg PO Q6HR PRN 12/01/13 [History]


Topiramate [Topamax] 50 mg PO BID 08/06/14 [History]


Pregabalin [Lyrica] 300 mg PO BID 09/25/14 [History]


Lithium Carbonate [Eskalith] 1,200 mg PO DAILY 05/16/16 [History]


traZODone 150 mg PO DAILY 05/16/16 [History]


Eszopiclone 3 mg BEDTIME 02/28/19 [History]


Insulin Degludec [Tresiba Flextouch U-100] 50 unit SQ DAILY 02/28/19 [History]


Lithium Carbonate 600 mg BEDTIME 02/28/19 [History]


Omeprazole 20 mg PO ACBREAKFAST 10/12/19 [History]


hydrOXYzine pamoate [Hydroxyzine Pamoate] 50 - 100 mg PO BID PRN 10/12/19 [

History]


Acetaminophen [Tylenol] 650 mg PO Q4H PRN  tablet 10/17/19 [Rx]


Acetaminophen/HYDROcodone [Norco 325-10 MG] 1 tab PO Q6H PRN 7 Days #28 tablet 

10/17/19 [Rx]


Ciprofloxacin [Ciprofloxacin HCl] 500 mg PO BID 11 Days #22 tablet 10/17/19 [Rx]


Insulin Aspart [NovoLOG] 20 units SQ TID #1 box 10/17/19 [Rx]


Lurasidone HCl [Latuda] 120 mg PO BEDTIME #0 10/17/19 [Rx]


QUEtiapine [SEROquel] 50 mg PO QID PRN #0 10/17/19 [Rx]


metroNIDAZOLE [Flagyl] 500 mg PO TID 11 Days #33 tablet 10/17/19 [Rx]


sitaGLIPtin Phos/Metformin HCl [Janumet 50-1,000 MG] 1 tab PO BID #0 10/17/19 [

Rx]








Patient Handouts:  Skin Abscess, Easy-to-Read, Anal Fistula, Venous 

Thromboembolism Prevention


Referrals: 


Quang Loo MD [Physician] - 10/18/19 4:00 pm (please be there by 3:40 pm)





- Discharge Summary/Plan Comment


DC Time >30 min.: No





- Patient Data


Vitals - Most Recent: 


 Last Vital Signs











Temp  97.9 F   10/17/19 08:45


 


Pulse  71   10/17/19 08:45


 


Resp  18   10/17/19 08:45


 


BP  126/67   10/17/19 08:45


 


Pulse Ox  95   10/17/19 08:45











Weight - Most Recent: 260 lb 3.2 oz


Lab Results - Last 24 hrs: 


 Laboratory Results - last 24 hr











  10/17/19 10/17/19 Range/Units





  05:43 11:02 


 


POC Glucose  211 H  147 H  ()  mg/dL











WILLA Results - Last 24 hrs: 


 Microbiology











 10/12/19 17:00 Aerobic Blood Culture - Final





 Blood - Venous - Lab Draw    NO GROWTH AFTER 5 DAYS





 Anaerobic Blood Culture - Final





    NO GROWTH AFTER 5 DAYS


 


 10/12/19 17:00 Aerobic Blood Culture - Final





 Blood - Venous    NO GROWTH AFTER 5 DAYS





 Anaerobic Blood Culture - Final





    NO GROWTH AFTER 5 DAYS


 


 10/13/19 19:03 Salmonella/Shigella Screen - Final





 Stool / Feces Escherichia coli Shiga Toxins EIA - Final











Med Orders - Current: 


 Current Medications








Discontinued Medications





Acetaminophen (Tylenol)  650 mg PO Q4H PRN


   PRN Reason: Pain (Mild 1-3)/fever


   Last Admin: 10/16/19 11:40 Dose:  650 mg


Hydrocodone Bitart/Acetaminophen (Norco 325-10 Mg)  1 tab PO Q6H PRN


   PRN Reason: Pain (moderate 4-6)


   Last Admin: 10/17/19 05:39 Dose:  1 tab


Ceftriaxone Sodium (Rocephin) Confirm Administered Dose 2 gm .ROUTE .STK-MED ONE


   Stop: 10/13/19 20:03


   Last Admin: 10/13/19 20:24 Dose:  Not Given


Ceftriaxone Sodium (Rocephin)  2 gm IVPUSH Q24H [facility]


Ciprofloxacin (Ciprofloxacin Hcl)  500 mg PO BID [facility]


   Last Admin: 10/17/19 08:48 Dose:  500 mg


Enoxaparin Sodium (Lovenox)  40 mg SUBCUT Q24H [facility]


   Last Admin: 10/16/19 17:19 Dose:  40 mg


Gadoteridol (Prohance)  20 ml IV .AS DIRECTED ONE


   Stop: 10/14/19 11:04


   Last Admin: 10/14/19 11:48 Dose:  20 ml


Hydromorphone HCl (Dilaudid)  0.25 mg IVPUSH Q2H PRN


   PRN Reason: Pain (severe 7-10)


   Last Admin: 10/16/19 17:15 Dose:  0.25 mg


Hydroxyzine Pamoate (Vistaril)  50 - 100 mg PO BID PRN


   PRN Reason: Anxiety


Promethazine HCl 12.5 mg/ (Sodium Chloride)  50.5 mls @ 200 mls/hr IV Q6H PRN


   PRN Reason: Nausea/Vomiting


Sodium Chloride (Normal Saline)  1,000 mls @ 125 mls/hr IV ASDIRECTED [facility]


   Last Admin: 10/14/19 04:23 Dose:  125 mls/hr


Ciprofloxacin/Dextrose 400 mg/ (Premix)  200 mls @ 200 mls/hr IV Q12H [facility]


   Last Admin: 10/12/19 18:11 Dose:  200 mls/hr


Metronidazole 500 mg/ Premix  100 mls @ 100 mls/hr IV Q6H [facility]


   Last Admin: 10/12/19 22:16 Dose:  Not Given


Sodium Chloride (Normal Saline)  1,000 mls @ 999 mls/hr IV ONETIME ONE


   Stop: 10/12/19 20:07


   Last Admin: 10/12/19 19:19 Dose:  999 mls/hr


Ceftriaxone Sodium 2 gm/ (Sodium Chloride)  50 mls @ 200 mls/hr IV Q24H [facility]


   Last Admin: 10/13/19 20:24 Dose:  200 mls/hr


Vancomycin HCl 1 gm/ Sodium (Chloride)  250 mls @ 167 mls/hr IV Q12H [facility]


   Last Admin: 10/12/19 22:15 Dose:  Not Given


Vancomycin HCl 2 gm/ Sodium (Chloride)  250 mls @ 167 mls/hr IV Q12H [facility]


   Last Admin: 10/12/19 22:14 Dose:  Not Given


Vancomycin HCl 2 gm/ Sodium (Chloride)  500 mls @ 334.014 mls/hr IV Q12H [facility]


   Last Admin: 10/12/19 21:20 Dose:  334.014 mls/hr


Vancomycin HCl 2 gm/ Sodium (Chloride)  500 mls @ 250 mls/hr IV Q12H [facility]


   Last Admin: 10/14/19 10:50 Dose:  Not Given


Linezolid (Zyvox)  300 mls @ 300 mls/hr IV Q12H [facility]


   Last Admin: 10/14/19 11:58 Dose:  300 mls/hr


Metronidazole 500 mg/ Premix  100 mls @ 100 mls/hr IV Q8H [facility]


   Stop: 10/16/19 12:00


   Last Admin: 10/16/19 09:34 Dose:  100 mls/hr


Ciprofloxacin/Dextrose 400 mg/ (Premix)  200 mls @ 200 mls/hr IV Q12H [facility]


   Last Admin: 10/16/19 05:09 Dose:  200 mls/hr


Insulin Glargine (Lantus Solostar)  50 units SUBCUT DAILY [facility]


   Last Admin: 10/13/19 14:18 Dose:  50 units


Insulin Glargine (Lantus Solostar)  50 units SUBCUT DAILY [facility]


   Last Admin: 10/17/19 08:50 Dose:  50 units


Insulin Human Lispro (Humalog)  15 unit SUBCUT TIDMEALS [facility]


   Last Admin: 10/14/19 10:49 Dose:  Not Given


Insulin Human Lispro (Humalog)  0 unit SUBCUT TIDMEALS [facility]; Protocol


   Last Admin: 10/17/19 08:51 Dose:  6 units


Insulin Human Lispro (Humalog)  15 unit SUBCUT TIDMEALS [facility]


   Last Admin: 10/14/19 17:05 Dose:  15 units


Insulin Human Lispro (Humalog)  12 unit SUBCUT ONETIME STA


   Stop: 10/14/19 21:23


   Last Admin: 10/14/19 21:37 Dose:  12 units


Insulin Human Lispro (Humalog)  18 unit SUBCUT TIDMEALS [facility]


   Last Admin: 10/16/19 10:02 Dose:  Not Given


Insulin Human Lispro (Humalog)  20 unit SUBCUT TIDMEALS [facility]


   Last Admin: 10/17/19 08:51 Dose:  20 units


Insulin Human Regular (Humulin R)  5 unit IV ONETIME ONE


   Stop: 10/12/19 19:07


   Last Admin: 10/12/19 19:27 Dose:  5 units


Insulin Human Regular (Humulin R)  10 unit SUBCUT ONETIME ONE


   Stop: 10/12/19 22:10


   Last Admin: 10/12/19 22:54 Dose:  10 units


Iopamidol (Isovue-370 (76%))  100 ml IV ONETIME ONE


   Stop: 10/13/19 08:28


   Last Admin: 10/13/19 08:38 Dose:  100 ml


Ketorolac Tromethamine (Toradol)  30 mg IVPUSH Q6H PRN


   PRN Reason: Pain (moderate 4-6)


   Last Admin: 10/17/19 09:00 Dose:  30 mg


Loperamide HCl (Imodium)  2 mg PO Q6H PRN


   PRN Reason: Diarrhea


   Last Admin: 10/14/19 10:18 Dose:  2 mg


Magnesium Chloride (Mag-64)  128 mg PO DAILY [facility]


   Last Admin: 10/15/19 10:54 Dose:  128 mg


Magnesium Chloride (Mag-64)  192 mg PO DAILY [facility]


   Last Admin: 10/17/19 08:48 Dose:  192 mg


Metronidazole (Flagyl)  500 mg PO TID [facility]


   Last Admin: 10/17/19 08:48 Dose:  500 mg


Nicotine (Habitrol)  7 mg TRDERM DAILY [facility]


   Last Admin: 10/17/19 08:46 Dose:  Not Given


(Eszopiclone [ Eszopiclone] 3 Mg)* Pt Own Med*  3 mg PO BEDTIME PRN


   PRN Reason: Insomnia


Ondansetron HCl (Zofran Odt)  4 mg PO Q4H PRN


   PRN Reason: nausea, able to take PO


Pantoprazole Sodium (Protonix***)  40 mg PO ACBREAKFAST [facility]


   Last Admin: 10/17/19 06:29 Dose:  40 mg


Potassium Chloride (Klor-Con M20)  20 meq PO BID [facility]


   Last Admin: 10/17/19 08:47 Dose:  20 meq


Pregabalin (Lyrica)  300 mg PO BID [facility]


   Last Admin: 10/17/19 08:59 Dose:  300 mg


Saccharomyces Boulardii (Florastor)  250 mg PO BID [facility]


   Last Admin: 10/17/19 08:47 Dose:  250 mg


Sodium Chloride (Saline Flush)  10 ml FLUSH ASDIRECTED PRN


   PRN Reason: Keep Vein Open


   Last Admin: 10/17/19 09:00 Dose:  10 ml











- Exam


General: Reports: Alert, Oriented, Cooperative


Lungs: Reports: Clear to Auscultation, Normal Respiratory Effort


Cardiovascular: Reports: Regular Rate, Regular Rhythm


GI/Abdominal Exam: Normal Bowel Sounds, Soft, Non-Tender, No Distention


Wound/Incisions: Reports: No Drainage (slough tissue present at opening, some 

drainage when expressed but none when observed. ), Erythema Improving (down to 

buttocks surrounding gluteal cleft.)

## 2020-09-18 ENCOUNTER — HOSPITAL ENCOUNTER (EMERGENCY)
Dept: HOSPITAL 7 - FB.ED | Age: 35
Discharge: HOME | End: 2020-09-18
Payer: COMMERCIAL

## 2020-09-18 VITALS — DIASTOLIC BLOOD PRESSURE: 92 MMHG | HEART RATE: 94 BPM | SYSTOLIC BLOOD PRESSURE: 153 MMHG

## 2020-09-18 DIAGNOSIS — Z88.8: ICD-10-CM

## 2020-09-18 DIAGNOSIS — Z79.899: ICD-10-CM

## 2020-09-18 DIAGNOSIS — F41.9: ICD-10-CM

## 2020-09-18 DIAGNOSIS — E66.9: ICD-10-CM

## 2020-09-18 DIAGNOSIS — Z48.817: Primary | ICD-10-CM

## 2020-09-18 DIAGNOSIS — Z91.040: ICD-10-CM

## 2020-09-18 DIAGNOSIS — Z79.4: ICD-10-CM

## 2020-09-18 DIAGNOSIS — Z88.0: ICD-10-CM

## 2020-09-18 DIAGNOSIS — F31.9: ICD-10-CM

## 2020-09-18 DIAGNOSIS — E11.40: ICD-10-CM

## 2020-09-18 NOTE — EDM.PDOC
ED HPI GENERAL MEDICAL PROBLEM





- General


Chief Complaint: Wound Recheck


Stated Complaint: OPEN INCISIONS


Time Seen by Provider: 20 23:07


Source of Information: Reports: Patient


History Limitations: Reports: No Limitations





- History of Present Illness


INITIAL COMMENTS - FREE TEXT/NARRATIVE: 





34 yo F who had Lump removed from both breasts 2 days ago. Presented to ER with 

concerns for possible wound infection and also requesting pain medications. No 

fever. She rates pain as 6/10. Presented to the ER for further evaluation


Onset: Today


Duration: Day(s): (2 days)





- Related Data


                                    Allergies











Allergy/AdvReac Type Severity Reaction Status Date / Time


 


latex Allergy  Rash Verified 10/16/19 01:06


 


naproxen [From Naprosyn] Allergy  Nausea and Verified 10/16/19 01:06





   Vomiting  


 


Penicillins Allergy  Rash Verified 10/16/19 01:06











Home Meds: 


                                    Home Meds





Ibuprofen [Motrin] 600 mg PO Q6HR PRN 13 [History]


Topiramate [Topamax] 50 mg PO BID 14 [History]


Pregabalin [Lyrica] 300 mg PO BID 14 [History]


Lithium Carbonate [Eskalith] 1,200 mg PO DAILY 16 [History]


traZODone 150 mg PO DAILY 16 [History]


Eszopiclone 3 mg BEDTIME 19 [History]


Insulin Degludec [Tresiba Flextouch U-100] 50 unit SQ DAILY 19 [History]


Lithium Carbonate 600 mg BEDTIME 19 [History]


Omeprazole 20 mg PO ACBREAKFAST 10/12/19 [History]


hydrOXYzine pamoate [Hydroxyzine Pamoate] 50 - 100 mg PO BID PRN 10/12/19 

[History]


Acetaminophen [Tylenol] 650 mg PO Q4H PRN  tablet 10/17/19 [Rx]


Acetaminophen/HYDROcodone [Norco 325-10 MG] 1 tab PO Q6H PRN 7 Days #28 tablet 

10/17/19 [Rx]


Ciprofloxacin [Ciprofloxacin HCl] 500 mg PO BID 11 Days #22 tablet 10/17/19 [Rx]


Insulin Aspart [NovoLOG] 20 units SQ TID #1 box 10/17/19 [Rx]


Lurasidone HCl [Latuda] 120 mg PO BEDTIME #0 10/17/19 [Rx]


QUEtiapine [SEROquel] 50 mg PO QID PRN #0 10/17/19 [Rx]


metroNIDAZOLE [Flagyl] 500 mg PO TID 11 Days #33 tablet 10/17/19 [Rx]


sitaGLIPtin Phos/Metformin HCl [Janumet 50-1,000 MG] 1 tab PO BID #0 10/17/19 

[Rx]











Past Medical History


HEENT History: Reports: Impaired Vision


Other HEENT History: no dentures


Cardiovascular History: Reports: None


Respiratory History: Reports: Bronchitis, Recurrent


Gastrointestinal History: Reports: Cholelithiasis


Genitourinary History: Reports: None


OB/GYN History: Reports: Polycystic Ovaries, Pregnancy


Other OB/GYN History: 


Musculoskeletal History: Reports: Fibromyalgia


Neurological History: Reports: Migraines, Neuropathy, Diabetic


Psychiatric History: Reports: Anxiety, Bipolar, Depression, Panic Attack, Psych 

Hospitalization(s), Suicide Attempt


Endocrine/Metabolic History: Reports: Diabetes, Type II, Obesity/BMI 30+


Hematologic History: Reports: None


Dermatologic History: Reports: Cellulitis





- Infectious Disease History


Infectious Disease History: Reports: None





- Past Surgical History


HEENT Surgical History: Reports: Adenoidectomy, Oral Surgery, Tonsillectomy


GI Surgical History: Reports: Appendectomy, Cholecystectomy, Other (See Below)


Other GI Surgeries/Procedures: exp lap for ruptured cyst


Female  Surgical History: Reports:  Section


Other Female  Surgeries/Procedures: CS x 1


Neurological Surgical History: Reports: None


Musculoskeletal Surgical History: Reports: None





Social & Family History





- Family History


Family Medical History: Noncontributory





- Caffeine Use


Caffeine Use: Reports: Coffee, Soda





- Living Situation & Occupation


Living situation: Reports: 


Occupation: Unemployed





ED ROS GENERAL





- Review of Systems


Review Of Systems: See Below


Constitutional: Reports: No Symptoms


HEENT: Reports: No Symptoms


Respiratory: Reports: No Symptoms


Cardiovascular: Reports: No Symptoms


Endocrine: Reports: No Symptoms


GI/Abdominal: Reports: No Symptoms


: Reports: No Symptoms


Musculoskeletal: Reports: No Symptoms


Skin: Reports: No Symptoms


Neurological: Reports: No Symptoms


Psychiatric: Reports: No Symptoms


Hematologic/Lymphatic: Reports: No Symptoms


Immunologic: Reports: No Symptoms





ED EXAM, SKIN/RASH


Exam: See Below


Exam Limited By: No Limitations


General Appearance: Alert, WD/WN, No Apparent Distress


Eye Exam: Bilateral Eye: PERRL


Ears: Normal External Exam, Normal Canal, Normal TMs


Nose: Normal Inspection, Normal Mucosa


Throat/Mouth: Normal Inspection, Normal Lips, Normal Teeth, Normal Oropharynx


Head: Atraumatic, Normocephalic


Neck: Normal Inspection, Supple, Non-Tender


Respiratory/Chest: No Respiratory Distress, Lungs Clear


Cardiovascular: Normal Peripheral Pulses, Regular Rate, Rhythm


GI/Abdominal: Normal Bowel Sounds, Soft, Non-Tender


Back Exam: Normal Inspection, Full Range of Motion


Extremities: Normal Inspection, Normal Range of Motion


Neurological: Alert, Oriented, CN II-XII Intact, Normal Cognition


Psychiatric: Normal Affect, Normal Mood


Skin: Warm, Dry, Intact


Lymphatic: No Adenopathy





Course





- Vital Signs


Last Recorded V/S: 


                                Last Vital Signs











Temp  37.0 C   20 23:00


 


Pulse  94   20 23:00


 


Resp  17   20 23:00


 


BP  153/92 H  20 23:00


 


Pulse Ox  99   20 23:00














Departure





- Departure


Time of Disposition: 23:13


Disposition: Home, Self-Care 01


Clinical Impression: 


 Visit for wound check








- Discharge Information


*PRESCRIPTION DRUG MONITORING PROGRAM REVIEWED*: No


*COPY OF PRESCRIPTION DRUG MONITORING REPORT IN PATIENT BERNICE: No


Referrals: 


Dheeraj Brannon MD [Primary Care Provider] - 


Forms:  ED Department Discharge


Additional Instructions: 


Follow with PCP


Return if symptoms worsen


Call your Physician or Return to Emergency Department if:





   * Your condition worsens in any way.


   * You develop fever greater than 100.4.


   * You have vomitting that does not stop with medications.


   * You have pain that is not controlled with medications.





Sepsis Event Note (ED)





- Evaluation


Sepsis Screening Result: No Definite Risk





- Focused Exam


Vital Signs: 


                                   Vital Signs











  Temp Pulse Resp BP Pulse Ox


 


 20 23:00  37.0 C  94  17  153/92 H  99

## 2020-10-18 ENCOUNTER — HOSPITAL ENCOUNTER (EMERGENCY)
Dept: HOSPITAL 7 - FB.ED | Age: 35
Discharge: HOME | End: 2020-10-18
Payer: COMMERCIAL

## 2020-10-18 VITALS — HEART RATE: 96 BPM | SYSTOLIC BLOOD PRESSURE: 142 MMHG | DIASTOLIC BLOOD PRESSURE: 78 MMHG

## 2020-10-18 DIAGNOSIS — E11.40: ICD-10-CM

## 2020-10-18 DIAGNOSIS — Z79.899: ICD-10-CM

## 2020-10-18 DIAGNOSIS — L50.9: Primary | ICD-10-CM

## 2020-10-18 DIAGNOSIS — F31.9: ICD-10-CM

## 2020-10-18 DIAGNOSIS — E66.9: ICD-10-CM

## 2020-10-18 DIAGNOSIS — F41.9: ICD-10-CM

## 2020-10-18 DIAGNOSIS — Z79.4: ICD-10-CM

## 2020-10-18 DIAGNOSIS — Z88.6: ICD-10-CM

## 2020-10-18 DIAGNOSIS — Z91.040: ICD-10-CM

## 2020-10-18 DIAGNOSIS — Z88.0: ICD-10-CM

## 2020-10-18 PROCEDURE — 99282 EMERGENCY DEPT VISIT SF MDM: CPT

## 2020-10-18 NOTE — EDM.PDOC
ED HPI GENERAL MEDICAL PROBLEM





- General


Chief Complaint: Allergic Reaction


Stated Complaint: hives


Time Seen by Provider: 10/18/20 23:11


Source of Information: Reports: Patient


History Limitations: Reports: No Limitations





- History of Present Illness


INITIAL COMMENTS - FREE TEXT/NARRATIVE: 





Presents with a generalized pruritic rash x 1 week. No new soaps, lotions, 

detergents, shampoos, or medications. Patient received a "Cortisone shot" and 

Vistaril Rx from her PMD on 10/15/20, she has obtained no improvement. She 

believes her symptoms may be due to stress. Denies difficulty swallowing or 

breathing.


Duration: Week(s): (1)


Severity: Mild


  ** Bilateral Lower Leg


Pain Score (Numeric/FACES): 4





- Related Data


                                    Allergies











Allergy/AdvReac Type Severity Reaction Status Date / Time


 


latex Allergy  Rash Verified 10/16/19 01:06


 


naproxen [From Naprosyn] Allergy  Nausea and Verified 10/16/19 01:06





   Vomiting  


 


Penicillins Allergy  Rash Verified 10/16/19 01:06











Home Meds: 


                                    Home Meds





Ibuprofen [Motrin] 600 mg PO Q6HR PRN 13 [History]


Topiramate [Topamax] 50 mg PO BID 14 [History]


Pregabalin [Lyrica] 300 mg PO BID 14 [History]


Lithium Carbonate [Eskalith] 1,200 mg PO DAILY 16 [History]


traZODone 150 mg PO DAILY 16 [History]


Eszopiclone 3 mg BEDTIME 19 [History]


Insulin Degludec [Tresiba Flextouch U-100] 50 unit SQ DAILY 19 [History]


Lithium Carbonate 600 mg BEDTIME 19 [History]


hydrOXYzine pamoate [Hydroxyzine Pamoate] 50 - 100 mg PO BID PRN 10/12/19 

[History]


Acetaminophen [Tylenol] 650 mg PO Q4H PRN  tablet 10/17/19 [Rx]


Acetaminophen/HYDROcodone [Norco 325-10 MG] 1 tab PO Q6H PRN 7 Days #28 tablet 

10/17/19 [Rx]


Insulin Aspart [NovoLOG] 20 units SQ TID #1 box 10/17/19 [Rx]


Lurasidone HCl [Latuda] 120 mg PO BEDTIME #0 10/17/19 [Rx]


QUEtiapine [SEROquel] 50 mg PO QID PRN #0 10/17/19 [Rx]


metroNIDAZOLE [Flagyl] 500 mg PO TID 11 Days #33 tablet 10/17/19 [Rx]


sitaGLIPtin Phos/Metformin HCl [Janumet 50-1,000 MG] 1 tab PO BID #0 10/17/19 

[Rx]


predniSONE [Prednisone] 60 mg PO DAILY 4 Days #12 tablet 10/18/20 [Rx]











Past Medical History


HEENT History: Reports: Impaired Vision


Other HEENT History: no dentures


Cardiovascular History: Reports: None


Respiratory History: Reports: Bronchitis, Recurrent


Gastrointestinal History: Reports: Cholelithiasis


Genitourinary History: Reports: None


OB/GYN History: Reports: Polycystic Ovaries, Pregnancy


Other OB/GYN History: 


Musculoskeletal History: Reports: Fibromyalgia


Neurological History: Reports: Migraines, Neuropathy, Diabetic


Psychiatric History: Reports: Anxiety, Bipolar, Depression, Panic Attack, Psych 

Hospitalization(s), Suicide Attempt


Endocrine/Metabolic History: Reports: Diabetes, Type II, Obesity/BMI 30+


Hematologic History: Reports: None


Dermatologic History: Reports: Cellulitis





- Infectious Disease History


Infectious Disease History: Reports: None





- Past Surgical History


HEENT Surgical History: Reports: Adenoidectomy, Oral Surgery, Tonsillectomy


GI Surgical History: Reports: Appendectomy, Cholecystectomy, Other (See Below)


Other GI Surgeries/Procedures: exp lap for ruptured cyst


Female  Surgical History: Reports:  Section


Other Female  Surgeries/Procedures: CS x 1


Neurological Surgical History: Reports: None


Musculoskeletal Surgical History: Reports: None





Social & Family History





- Family History


Family Medical History: Noncontributory





- Caffeine Use


Caffeine Use: Reports: Coffee, Soda





- Living Situation & Occupation


Living situation: Reports: 


Occupation: Unemployed





ED ROS ALLERGIC REACTION





- Review of Systems


Review Of Systems: Comprehensive ROS is negative, except as noted in HPI.





ED EXAM GENERAL NO PERIP PULSE





- Physical Exam


Exam: See Below


Exam Limited By: No Limitations


General Appearance: Alert, WD/WN, No Apparent Distress


Ears: Normal External Exam


Nose: Normal Inspection


Throat/Mouth: Normal Inspection, No Airway Compromise


Head: Atraumatic, Normocephalic


Respiratory/Chest: No Respiratory Distress, Lungs Clear, Normal Breath Sounds


Cardiovascular: Regular Rate, Rhythm, No Murmur


Neurological: Alert, Normal Cognition


Psychiatric: Normal Affect, Normal Mood


Skin Exam: Other (generalized urticarial rash)





Course





- Vital Signs


Last Recorded V/S: 





                                Last Vital Signs











Temp  36.7 C   10/18/20 23:04


 


Pulse  96   10/18/20 23:04


 


Resp  18   10/18/20 23:04


 


BP  142/78 H  10/18/20 23:04


 


Pulse Ox  100   10/18/20 23:04














- Orders/Labs/Meds


Orders: 





                               Active Orders 24 hr











 Category Date Time Status


 


 predniSONE Med  10/18/20 23:11 Once





 60 mg PO ONETIME ONE   








                                Medication Orders





Prednisone (Prednisone)  60 mg PO ONETIME ONE


   Stop: 10/18/20 23:12








Meds: 





Medications











Generic Name Dose Route Start Last Admin





  Trade Name Maria Elena  PRN Reason Stop Dose Admin


 


Prednisone  60 mg  10/18/20 23:11 





  Prednisone  PO  10/18/20 23:12 





  ONETIME ONE  














Departure





- Departure


Time of Disposition: 23:16


Disposition: Home, Self-Care 01


Condition: Good


Clinical Impression: 


 Urticaria








- Discharge Information


*PRESCRIPTION DRUG MONITORING PROGRAM REVIEWED*: No


*COPY OF PRESCRIPTION DRUG MONITORING REPORT IN PATIENT BERNICE: Not Applicable


Prescriptions: 


predniSONE [Prednisone] 60 mg PO DAILY 4 Days #12 tablet


Instructions:  Hives, Easy-to-Read


Referrals: 


Dheeraj Brannon MD [Primary Care Provider] - 2 Days


Forms:  ED Department Discharge


Additional Instructions: 


Continue Vistaril. Fill the prescription for Prednisone at [facility] and take 

as directed. Follow up with your primary physician in 2 days if symptoms don't 

improve. Return to the ER if you feel you are having a medical emergency.





Sepsis Event Note (ED)





- Evaluation


Sepsis Screening Result: No Definite Risk





- Focused Exam


Vital Signs: 





                                   Vital Signs











  Temp Pulse Resp BP Pulse Ox


 


 10/18/20 23:04  36.7 C  96  18  142/78 H  100














- My Orders


Last 24 Hours: 





My Active Orders





10/18/20 23:11


predniSONE   60 mg PO ONETIME ONE 














- Assessment/Plan


Last 24 Hours: 





My Active Orders





10/18/20 23:11


predniSONE   60 mg PO ONETIME ONE

## 2020-12-31 ENCOUNTER — HOSPITAL ENCOUNTER (EMERGENCY)
Dept: HOSPITAL 7 - FB.ED | Age: 35
LOS: 1 days | Discharge: HOME | End: 2021-01-01
Payer: COMMERCIAL

## 2020-12-31 DIAGNOSIS — Z79.899: ICD-10-CM

## 2020-12-31 DIAGNOSIS — Z88.0: ICD-10-CM

## 2020-12-31 DIAGNOSIS — F17.210: ICD-10-CM

## 2020-12-31 DIAGNOSIS — Z91.040: ICD-10-CM
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
## 2020-12-31 DIAGNOSIS — F41.9: ICD-10-CM

## 2020-12-31 DIAGNOSIS — E11.40: ICD-10-CM

## 2020-12-31 DIAGNOSIS — S90.32XA: Primary | ICD-10-CM

## 2020-12-31 DIAGNOSIS — Z79.4: ICD-10-CM

## 2020-12-31 DIAGNOSIS — Z88.8: ICD-10-CM

## 2020-12-31 DIAGNOSIS — E66.9: ICD-10-CM

## 2020-12-31 DIAGNOSIS — F31.9: ICD-10-CM

## 2020-12-31 NOTE — EDM.PDOC
ED HPI GENERAL MEDICAL PROBLEM





- General


Chief Complaint: Lower Extremity Injury/Pain


Stated Complaint: FOOT INJURY


Time Seen by Provider: 20 23:45


Source of Information: Reports: Patient


History Limitations: Reports: No Limitations





- History of Present Illness


INITIAL COMMENTS - FREE TEXT/NARRATIVE: 





35-year-old female who reports was working at [employer] and dropped a empty wooden 

pallet on her left distal foot at approximately 8:30 PM tonight. She has a 7/10 

level of pain in the foot and great toe distally she reports the pain is a sharp

and throbbing pain and it is to shoot up her foot. It is worse if she tries to 

ambulate. She is able to ambulate but with a limp and with increased pain. There

are no open wounds. There were no other injuries. She presents to the emergency 

department via private vehicle by personnel from her job. She had no antecedent 

problems. There are no other associated signs or symptoms. There are no other 

modifying factors.


Onset: Today (8:30 PM)


Duration: Constant


Location: Reports: Lower Extremity, Left (Left foot)


Quality: Reports: Ache, Sharp


Severity: Moderate


Improves with: Reports: Rest


Worsens with: Reports: Other (Palpation. Bearing weight.), Movement


Context: Reports: Trauma


Associated Symptoms: Reports: No Other Symptoms


Treatments PTA: Reports: Cold Therapy


  ** L foot


Pain Score (Numeric/FACES): 7





- Related Data


                                    Allergies











Allergy/AdvReac Type Severity Reaction Status Date / Time


 


latex Allergy  Rash Verified 20 23:25


 


naproxen [From Naprosyn] Allergy  Nausea and Verified 20 23:25





   Vomiting  


 


Penicillins Allergy  Rash Verified 20 23:25











Home Meds: 


                                    Home Meds





Ibuprofen [Motrin] 800 mg PO Q6HR PRN 13 [History]


Pregabalin [Lyrica] 300 mg PO BID 14 [History]


traZODone 150 mg PO DAILY 16 [History]


Insulin Degludec [Tresiba Flextouch U-100] 50 unit SQ DAILY 19 [History]


Lithium Carbonate 600 mg BID 19 [History]


hydrOXYzine pamoate [Hydroxyzine Pamoate] 50 - 100 mg PO BID PRN 10/12/19 

[History]


Acetaminophen [Tylenol] 650 mg PO Q4H PRN  tablet 10/17/19 [Rx]


Insulin Aspart [NovoLOG] 20 units SQ TID #1 box 10/17/19 [Rx]


QUEtiapine [SEROquel] 50 mg PO QID PRN #0 10/17/19 [Rx]


Lurasidone HCl [Latuda] 120 mg PO DAILY 20 [History]


sitaGLIPtin Phos/Metformin HCl [Janumet 50-1,000 MG] 1 tab PO DAILY 20 

[History]











Past Medical History


HEENT History: Reports: Impaired Vision


Other HEENT History: no dentures


Respiratory History: Reports: Bronchitis, Recurrent


Gastrointestinal History: Reports: GERD


OB/GYN History: Reports: Polycystic Ovaries


Other OB/GYN History: 


Musculoskeletal History: Reports: Fibromyalgia


Neurological History: Reports: Migraines, Neuropathy, Diabetic


Psychiatric History: Reports: Anxiety, Bipolar, Depression, Panic Attack, Psych 

Hospitalization(s), Suicide Attempt


Endocrine/Metabolic History: Reports: Diabetes, Type II, Obesity/BMI 30+


Dermatologic History: Reports: Cellulitis





- Infectious Disease History


Infectious Disease History: Reports: None





- Past Surgical History


HEENT Surgical History: Reports: Adenoidectomy, Oral Surgery, Tonsillectomy


GI Surgical History: Reports: Appendectomy, Cholecystectomy, Other (See Below)


Other GI Surgeries/Procedures: exp lap for ruptured cyst


Female  Surgical History: Reports:  Section


Other Female  Surgeries/Procedures: CS x 1


Musculoskeletal Surgical History: Reports: Arthroscopic Knee (Bilateral knees), 

Carpal Tunnel (Right)


Dermatological Surgical History: Reports: None





Social & Family History





- Tobacco Use


Tobacco Use Status *Q: Current Every Day Tobacco User


Years of Tobacco use: 22


Packs/Tins Daily: 1





- Caffeine Use


Caffeine Use: Reports: Coffee, Energy Drinks, Soda, Tea





- Alcohol Use


Alcohol Use History: Yes


Alcohol Use Frequency: Rarely





- Recreational Drug Use


Recreational Drug Use: No





- Living Situation & Occupation


Living situation: Reports: 


Occupation: Employed (Works at [employer])





Review of Systems





- Review of Systems


Review Of Systems: See Below


Constitutional: Reports: No Symptoms


Eyes: Reports: No Symptoms


Ears: Reports: No Symptoms


Nose: Reports: No Symptoms


Mouth/Throat: Reports: No Symptoms


Respiratory: Reports: No Symptoms


Cardiovascular: Reports: No Symptoms


GI/Abdominal: Reports: No Symptoms


Genitourinary: Reports: No Symptoms


Musculoskeletal: Reports: Foot Pain (Left foot pain status post injury)


Skin: Reports: No Symptoms


Neurological: Reports: No Symptoms


Psychiatric: Reports: No Symptoms





ED EXAM, GENERAL





- Physical Exam


Exam: See Below


Exam Limited By: No Limitations


General Appearance: Alert, WD/WN, Mild Distress


Eye Exam: Bilateral Eye: EOMI, Normal Inspection


Ears: Normal External Exam, Hearing Grossly Normal


Ear Exam: Bilateral Ear: Auricle Normal


Nose: Normal Inspection, Normal Mucosa, No Blood


Throat/Mouth: Normal Inspection, Normal Lips, Normal Oropharynx, Normal Voice, 

No Airway Compromise


Head: Atraumatic, Normocephalic


Neck: Normal Inspection, Supple, Non-Tender, Full Range of Motion


Respiratory/Chest: No Respiratory Distress, Lungs Clear, Normal Breath Sounds, 

No Accessory Muscle Use, Chest Non-Tender


Cardiovascular: Normal Peripheral Pulses, Regular Rate, Rhythm, No Murmur


Peripheral Pulses: 2+: Radial (L), Radial (R), Dorsalis Pedis (L)


GI/Abdominal: Normal Bowel Sounds, Soft, Non-Tender, No Mass


Back Exam: Normal Inspection


Extremities: Normal Range of Motion, Normal Capillary Refill, Pedal Edema (Some 

edema in both legs.), Other (Pain with palpation over the distal left foot)


Neurological: Alert, Oriented, CN II-XII Intact, Normal Cognition, No 

Motor/Sensory Deficits


Psychiatric: Normal Affect


Skin Exam: Warm, Dry, Intact, Normal Color, No Rash





Course





- Vital Signs


Last Recorded V/S: 


                                Last Vital Signs











Temp  36.8 C   20 23:19


 


Pulse  94   20 23:19


 


Resp  18   20 23:19


 


BP  144/86 H  20 23:19


 


Pulse Ox  100   20 23:19














- Orders/Labs/Meds


Orders: 


                               Active Orders 24 hr











 Category Date Time Status


 


 Foot Comp Min 3V Lt [CR] Stat Exams  20 23:57 Taken














- Radiology Interpretation


Free Text/Narrative:: 





Left foot x-ray shows no definite fracture.





- Re-Assessments/Exams


Free Text/Narrative Re-Assessment/Exam: 





21 00:35: The x-ray of the left foot showed no definite fracture. I will 

have the patient off work until 1/3/2021 and she should be able to go back to 

work without any restrictions. We will place the patient in a postop shoe and 

have her bear weight as tolerated. She should apply ice packs intermittently to 

the area for the next 2-3 days and she can take ibuprofen and Tylenol for the 

pain as needed. Precautions and reasons for return to the emergency department 

were discussed with the patient while she was in the emergency department were 

detailed in the patient's discharge instructions.








Departure





- Departure


Time of Disposition: 00:55


Disposition: Home, Self-Care 01


Condition: Good


Clinical Impression: 


 Contusion of left foot, initial encounter








- Discharge Information


Instructions:  Foot Contusion, Easy-to-Read


Referrals: 


Dheeraj Brannon MD [Primary Care Provider] - 


Forms:  ED Department Discharge


Additional Instructions: 


The x-ray of your left foot showed no definite fracture. You appear to have a 

bruise to your left foot. You should use the postop shoe for comfort and 

support. You should bear weight as tolerated on your left foot. You should apply

ice packs intermittently to the left foot for the next 2-3 days and elevate your

left foot. You can take ibuprofen and Tylenol for your pain as needed. No work 

until 1/3/2021. Back to the emergency department for marked increase in pain, 

redness, fever or any other concerning sign or symptom.





Sepsis Event Note (ED)





- Evaluation


Sepsis Screening Result: No Definite Risk





- Focused Exam


Vital Signs: 


                                   Vital Signs











  Temp Pulse Resp BP Pulse Ox


 


 20 23:19  36.8 C  94  18  144/86 H  100


 


 20 23:14  36.8 C  94  15  144/86 H  100














- My Orders


Last 24 Hours: 


My Active Orders





20 23:57


Foot Comp Min 3V Lt [CR] Stat 














- Assessment/Plan


Last 24 Hours: 


My Active Orders





20 23:57


Foot Comp Min 3V Lt [CR] Stat

## 2021-01-01 VITALS — DIASTOLIC BLOOD PRESSURE: 65 MMHG | HEART RATE: 91 BPM | SYSTOLIC BLOOD PRESSURE: 125 MMHG

## 2021-01-04 NOTE — CR
INDICATION:  Dropped pallet on left foot, now with pain tip mid foot.  



LEFT FOOT:  Three view of the left were obtained 12/31/20 - no comparison.  



Large plantar calcaneal spur is noted.  



An acute fracture or dislocation was not identified.  



If occult fracture site is suspected clinically - if symptoms persist - 
reexamination in 10-14 days may be helpful.  

MTDD

## 2021-05-18 ENCOUNTER — HOSPITAL ENCOUNTER (EMERGENCY)
Dept: HOSPITAL 7 - FB.ED | Age: 36
LOS: 1 days | Discharge: HOME | End: 2021-05-19
Payer: COMMERCIAL

## 2021-05-18 DIAGNOSIS — E11.40: ICD-10-CM

## 2021-05-18 DIAGNOSIS — Z88.5: ICD-10-CM

## 2021-05-18 DIAGNOSIS — Z91.040: ICD-10-CM

## 2021-05-18 DIAGNOSIS — Z79.4: ICD-10-CM

## 2021-05-18 DIAGNOSIS — Z88.0: ICD-10-CM

## 2021-05-18 DIAGNOSIS — G43.909: Primary | ICD-10-CM

## 2021-05-18 DIAGNOSIS — E66.9: ICD-10-CM

## 2021-05-18 NOTE — EDM.PDOC
ED HPI GENERAL MEDICAL PROBLEM





- General


Stated Complaint: MIRGRAINE


Time Seen by Provider: 21 22:50


Source of Information: Reports: Patient, Family


History Limitations: Reports: No Limitations





- History of Present Illness


INITIAL COMMENTS - FREE TEXT/NARRATIVE: 





Patient presented to the ED because of headache  for 5 days. she rate it as 

10/10,throbbing,bi frontal area,however, she keeps staring at her cell phone. 

She also has nausea and vomiting x1. 





- Related Data


                                    Allergies











Allergy/AdvReac Type Severity Reaction Status Date / Time


 


latex Allergy  Rash Verified 21 14:13


 


naproxen [From Naprosyn] Allergy  Nausea and Verified 21 14:13





   Vomiting  


 


Penicillins Allergy  Rash Verified 21 14:13











Home Meds: 


                                    Home Meds





Ibuprofen [Motrin] 800 mg PO Q6HR PRN 13 [History]


Pregabalin [Lyrica] 300 mg PO BID 14 [History]


traZODone 150 mg PO DAILY 16 [History]


Insulin Degludec [Tresiba Flextouch U-100] 50 unit SQ DAILY 19 [History]


Lithium Carbonate 600 mg BID 19 [History]


hydrOXYzine pamoate [Hydroxyzine Pamoate] 50 - 100 mg PO BID PRN 10/12/19 

[History]


Acetaminophen [Tylenol] 650 mg PO Q4H PRN  tablet 10/17/19 [Rx]


Insulin Aspart [NovoLOG] 20 units SQ TID #1 box 10/17/19 [Rx]


QUEtiapine [SEROquel] 50 mg PO QID PRN #0 10/17/19 [Rx]


Lurasidone HCl [Latuda] 120 mg PO DAILY 20 [History]


sitaGLIPtin Phos/Metformin HCl [Janumet 50-1,000 MG] 1 tab PO DAILY 20 

[History]


Topiramate [Topamax] 50 mg PO BID #30 tablet 21 [Rx]











Past Medical History


HEENT History: Reports: Impaired Vision


Other HEENT History: no dentures


Cardiovascular History: Reports: None


Respiratory History: Reports: Bronchitis, Recurrent


Gastrointestinal History: Reports: GERD


Genitourinary History: Reports: None


OB/GYN History: Reports: Polycystic Ovaries


Other OB/GYN History: 


Musculoskeletal History: Reports: Fibromyalgia


Neurological History: Reports: Migraines, Neuropathy, Diabetic


Psychiatric History: Reports: Anxiety, Bipolar, Depression, Panic Attack, Psych 

Hospitalization(s), Suicide Attempt


Endocrine/Metabolic History: Reports: Diabetes, Type II, Obesity/BMI 30+


Hematologic History: Reports: None


Dermatologic History: Reports: Cellulitis





- Infectious Disease History


Infectious Disease History: Reports: None





- Past Surgical History


Musculoskeletal Surgical History: Reports: Arthroscopic Knee (Bilateral knees), 

Carpal Tunnel (Right)





Social & Family History





- Family History


Family Medical History: No Pertinent Family History





- Caffeine Use


Caffeine Use: Reports: Coffee, Energy Drinks, Soda, Tea





- Living Situation & Occupation


Living situation: Reports: 


Occupation: Employed (Works at [employer])





[facility] GENERAL





- Review of Systems


Review Of Systems: See Below


Constitutional: Reports: No Symptoms


HEENT: Reports: No Symptoms


Respiratory: Reports: No Symptoms


Cardiovascular: Reports: No Symptoms


Endocrine: Reports: No Symptoms


GI/Abdominal: Reports: Nausea, Vomiting


: Reports: No Symptoms


Musculoskeletal: Reports: No Symptoms


Skin: Reports: No Symptoms


Neurological: Reports: Headache





- Physical Exam


Exam: See Below


Exam Limited By: No Limitations


General Appearance: Alert, No Apparent Distress


Eye Exam: Bilateral Eye: PERRL


Ears: Normal External Exam, Normal Canal


Nose: Normal Inspection, Normal Mucosa


Throat/Mouth: Normal Inspection, Normal Lips, Normal Teeth


Head Exam: Atraumatic, Normocephalic


Neck: Normal Inspection, Supple, Non-Tender, Full Range of Motion


Respiratory/Chest: No Respiratory Distress, Lungs Clear, Normal Breath Sounds, N

o Accessory Muscle Use, Chest Non-Tender


Cardiovascular: Normal Peripheral Pulses, Regular Rate, Rhythm, No Edema, No 

Gallop, No JVD, No Murmur


GI/Abdominal: Normal Bowel Sounds, Soft, Non-Tender, No Organomegaly, No 

Distention


Neuro Exam (Abbreviated): Alert, Oriented, CN II-XII Intact





Course





- Vital Signs


Text/Narrative:: 





Imitrex 6 mg SC x1


Zofran ODT 4 mg PO x1





- Orders/Labs/Meds


Meds: 





Medications














Discontinued Medications














Generic Name Dose Route Start Last Admin





  Trade Name Freq  PRN Reason Stop Dose Admin


 


Ondansetron HCl  4 mg  21 22:57 





  Ondansetron 4 Mg Tab.Dis  PO  21 22:58 





  NOW STA  


 


Sumatriptan Succinate  6 mg  21 22:57 





  Sumatriptan 6 Mg/0.5 Ml Sdv  SUBCUT  21 22:58 





  NOW STA  














Departure





- Departure


Time of Disposition: 00:00


Disposition: Home, Self-Care 01


Condition: Good


Clinical Impression: 


 Migraine








- Discharge Information


Prescriptions: 


Topiramate [Topamax] 50 mg PO BID #30 tablet


Additional Instructions: 


Please read discharge instructions on Migraine


Quit drinking power drinks, it causes headache


Topamax 50 mg Twice daily for 15 days


Take ibuprofen 800 mg with tylenol 1000 mg every 8 hours as needed for headache


Follow up as needed

## 2021-05-19 VITALS — SYSTOLIC BLOOD PRESSURE: 144 MMHG | DIASTOLIC BLOOD PRESSURE: 77 MMHG | HEART RATE: 98 BPM

## 2021-12-23 ENCOUNTER — HOSPITAL ENCOUNTER (EMERGENCY)
Dept: HOSPITAL 7 - FB.ED | Age: 36
Discharge: HOME | End: 2021-12-23
Payer: COMMERCIAL

## 2021-12-23 VITALS — DIASTOLIC BLOOD PRESSURE: 86 MMHG | SYSTOLIC BLOOD PRESSURE: 134 MMHG | HEART RATE: 86 BPM

## 2021-12-23 DIAGNOSIS — Z72.0: ICD-10-CM

## 2021-12-23 DIAGNOSIS — Z79.4: ICD-10-CM

## 2021-12-23 DIAGNOSIS — J04.0: ICD-10-CM

## 2021-12-23 DIAGNOSIS — E66.9: ICD-10-CM

## 2021-12-23 DIAGNOSIS — E11.9: ICD-10-CM

## 2021-12-23 DIAGNOSIS — Z20.822: ICD-10-CM

## 2021-12-23 DIAGNOSIS — Z88.0: ICD-10-CM

## 2021-12-23 DIAGNOSIS — Z91.040: ICD-10-CM

## 2021-12-23 DIAGNOSIS — Z88.5: ICD-10-CM

## 2021-12-23 DIAGNOSIS — J45.40: Primary | ICD-10-CM

## 2021-12-23 LAB — SARS-COV-2 RNA RESP QL NAA+PROBE: NEGATIVE

## 2021-12-23 NOTE — EDM.PDOC
ED HPI GENERAL MEDICAL PROBLEM





- General


Chief Complaint: Respiratory Problem


Stated Complaint: cough,sob


Time Seen by Provider: 21 04:36


Source of Information: Reports: Patient


History Limitations: Reports: No Limitations





- History of Present Illness


INITIAL COMMENTS - FREE TEXT/NARRATIVE: 





36-year-old female who reports had onset of nasal congestion, cough, fever, 

sinus congestion and generalized body aches with some chest tightness on this 

last weekend. She was seen by Dr. Brannon on Monday of this week with other 

family members who had similar symptoms and was diagnosed with an upper 

respiratory/sinus infection and she was placed on doxycycline. She reports that 

her symptoms of continued unabating and she has developed a hoarse voice since 

last night and the feelings of difficulty breathing. She has had some 

posttussive emesis and she was feeling difficulty breathing this morning and 

that is what prompted her to come to the emergency department. She has diffuse 

body aches which she states she has all the time related to her fibromyalgia but

they seem to be a bit worse and it as an aching pain is all over and she would 

rate that is about a 7/10 and she also has tightness sharp pains in her chest 

with cough and at present now without cough that she would rated as about an 

8/10. No diarrhea. She has had persisting fevers up to 101F. She reports that 

her glucoses have been somewhat elevated in since last night they have been in 

the 230 range. There are no other associated signs or symptoms. There are no 

other modifying factors


Onset: Other (5 days)


Duration: Getting Worse


Location: Reports: Head, Chest, Generalized


Quality: Reports: Ache, Sharp


Severity: Moderate


Improves with: Reports: None


Worsens with: Reports: Breathing, Other (Cough)


Context: Reports: Other (As above.)


Associated Symptoms: Reports: No Other Symptoms (Except as above.)


Treatments PTA: Reports: Acetaminophen, Other (see below)


Other Treatments PTA: doxycycline





- Related Data


                                    Allergies











Allergy/AdvReac Type Severity Reaction Status Date / Time


 


latex Allergy  Rash Verified 21 04:34


 


naproxen [From Naprosyn] Allergy  Nausea and Verified 21 04:34





   Vomiting  


 


Penicillins Allergy  Rash Verified 21 04:34











Home Meds: 


                                    Home Meds





Ibuprofen [Motrin] 800 mg PO Q6HR PRN 13 [History]


Insulin Degludec [Tresiba Flextouch U-100] 50 unit SQ DAILY 19 [History]


Acetaminophen [Tylenol] 650 mg PO Q4H PRN  tablet 10/17/19 [Rx]


Insulin Aspart [NovoLOG] 20 units SQ TID #1 box 10/17/19 [Rx]


sitaGLIPtin Phos/Metformin HCl [Janumet 50-1,000 MG] 1 tab PO DAILY 20 

[History]


Doxycycline [Vibra-Tabs] 100 mg PO BID 21 [History]


Empagliflozin [Jardiance] 10 mg PO ASDIRECTED 21 [History]


LORazepam [Ativan] 1 mg PO ASDIRECTED 21 [History]


Semaglutide [Ozempic] 1 mg SQ ASDIRECTED 21 [History]


traMADol [Ultram] 50 mg PO ASDIRECTED 21 [History]











Past Medical History


HEENT History: Reports: Impaired Vision


Other HEENT History: no dentures


Respiratory History: Reports: Bronchitis, Recurrent


Gastrointestinal History: Reports: GERD


OB/GYN History: Reports: Polycystic Ovaries


Other OB/GYN History: 


Musculoskeletal History: Reports: Fibromyalgia


Neurological History: Reports: Migraines, Neuropathy, Diabetic


Psychiatric History: Reports: Anxiety, Bipolar, Depression, Panic Attack, Psych 

Hospitalization(s), Suicide Attempt


Endocrine/Metabolic History: Reports: Diabetes, Type II, Obesity/BMI 30+


Dermatologic History: Reports: Cellulitis





- Infectious Disease History


Infectious Disease History: Reports: None





- Past Surgical History


HEENT Surgical History: Reports: Adenoidectomy, Oral Surgery, Tonsillectomy


GI Surgical History: Reports: Appendectomy, Cholecystectomy, Other (See Below)


Other GI Surgeries/Procedures: exp lap for ruptured cyst


Female  Surgical History: Reports:  Section


Other Female  Surgeries/Procedures: CS x 1


Musculoskeletal Surgical History: Reports: Arthroscopic Knee, Carpal Tunnel





Social & Family History





- Tobacco Use


Tobacco Use Status *Q: Current Every Day Tobacco User





- Caffeine Use


Caffeine Use: Reports: Energy Drinks, Soda





- Alcohol Use


Alcohol Use History: No





- Living Situation & Occupation


Living situation: Reports: 


Occupation: Employed (Works at [employer])





ED ROS GENERAL





- Review of Systems


Review Of Systems: See Below


Constitutional: Reports: Fever, Chills, Malaise, Fatigue


HEENT: Reports: Throat Pain, Other (Nasal congestion. Hoarse voice.)


Respiratory: Reports: Shortness of Breath, Pleuritic Chest Pain, Cough


Cardiovascular: Reports: Chest Pain, Dyspnea on Exertion


Endocrine: Reports: Fatigue


GI/Abdominal: Reports: Vomiting (Some posttussive emesis.).  Denies: Diarrhea, 

Nausea


: Denies: Dysuria, Frequency


Musculoskeletal: Reports: Back Pain, Other (Generalized body aches.)


Skin: Denies: Diaphoresis, Rash


Neurological: Reports: Headache.  Denies: Dizziness


Psychiatric: Reports: Anxiety


Hematologic/Lymphatic: Denies: Easy Bleeding, Easy Bruising





ED EXAM, GENERAL





- Physical Exam


Exam: See Below


Exam Limited By: No Limitations


General Appearance: Alert, WD/WN, Moderate Distress (Appears in some discomfort.

 There is no respiratory distress.)


Eye Exam: Bilateral Eye: EOMI, Normal Inspection, PERRL


Ears: Normal External Exam, Hearing Grossly Normal


Ear Exam: Bilateral Ear: Auricle Normal


Nose: No Blood, Nasal Drainage


Throat/Mouth: No Airway Compromise, Other (Hoarse voice. Some pharyngeal 

erythema.)


Head: Atraumatic, Normocephalic


Neck: Normal Inspection, Supple, Full Range of Motion, Tender Lateral 

(Bilaterally in the anterior area.)


Respiratory/Chest: No Respiratory Distress, No Accessory Muscle Use, Wheezing 

(He scattered wheezes but good air movement.).  No: Rales, Rhonchi


Cardiovascular: Normal Peripheral Pulses, Regular Rate, Rhythm, No Murmur


Peripheral Pulses: 2+: Radial (L), Radial (R)


GI/Abdominal: Normal Bowel Sounds, Soft, Non-Tender, No Mass


Back Exam: Normal Inspection


Extremities: Normal Inspection, Normal Range of Motion, Non-Tender, No Pedal 

Edema, Normal Capillary Refill


Neurological: Alert, Oriented, CN II-XII Intact, Normal Cognition, No 

Motor/Sensory Deficits


Psychiatric: Normal Affect


Skin Exam: Warm, Dry, Intact, Normal Color, No Rash





Course





- Vital Signs


Last Recorded V/S: 


                                Last Vital Signs











Temp  36.8 C   21 04:10


 


Pulse  108 H  21 04:10


 


Resp  20   21 04:10


 


BP  148/92 H  21 04:10


 


Pulse Ox  99   21 04:10














- Orders/Labs/Meds


Orders: 


                               Active Orders 24 hr











 Category Date Time Status


 


 RT Aerosol Therapy [RC] ASDIRECTED Care  21 05:10 Active











Labs: 


                                Laboratory Tests











  21 Range/Units





  04:10 


 


Influenza Type A RNA  Negative  (NEGATIVE)  


 


Influenza Type B RNA  Negative  (NEGATIVE)  


 


SARS-CoV-2 RNA (SMITHA)  Negative  (NEGATIVE)  











Meds: 


Medications














Discontinued Medications














Generic Name Dose Route Start Last Admin





  Trade Name Freq  PRN Reason Stop Dose Admin


 


Albuterol/Ipratropium  3 ml  21 05:10  21 05:20





  Albuterol/Ipratropium 3.0-0.5 Mg/3 Ml Neb Soln  NEB  21 05:11  3 ml





  ONETIME ONE   Administration


 


Dexamethasone  8 mg  21 05:10  21 05:20





  Dexamethasone 4 Mg/Ml Sdv  PO  21 05:11  8 mg





  ONETIME ONE   Administration














- Re-Assessments/Exams


Free Text/Narrative Re-Assessment/Exam: 





21 05:08: Covid and influenza screens were negative. She is really in no 

respiratory distress and has normal O2 saturations. However, she did have some 

mild wheezing and a hoarse voice. I will give her Decadron 8 mg orally and I 

will give her a DuoNeb and reassess after this.





21 05:55: Patient does feel improved after the nebulizer treatment. She 

remains in no respiratory distress. She does have an albuterol inhaler at home 

and I gave her a tube spacer to use with the inhaler and she should use with 

this albuterol inhaler every 4 hours as needed for cough or wheezing. She needs 

to rest. I have given her off of work until 2021. Cool mist humidifier at 

home. Precautions and reasons for return to the emergency department were 

discussed with the patient while she was in the emergency department and were 

detailed in the patient's discharge instructions.








Departure





- Departure


Time of Disposition: 06:02


Disposition: Home, Self-Care 01


Condition: Good (Improved)


Clinical Impression: 


 Laryngitis





URI (upper respiratory infection)


Qualifiers:


 URI type: unspecified URI Qualified Code(s): J06.9 - Acute upper respiratory 

infection, unspecified





Reactive airway disease


Qualifiers:


 Asthma severity: moderate Asthma persistence: persistent Asthma complication 

type: uncomplicated Qualified Code(s): J45.40 - Moderate persistent asthma, 

uncomplicated








- Discharge Information


Instructions:  Steps to Quit Smoking, Easy-to-Read, Upper Respiratory Infection,

 Adult, Easy-to-Read, Laryngitis, Easy-to-Read


Referrals: 


Dheeraj Brannon MD [Primary Care Provider] - 


Forms:  ED Department Discharge, ED Return to Work/School Form


Additional Instructions: 


Your Covid and influenza screens were negative. You must probably have a viral 

upper respiratory infection that is causing your wheezing and your hoarse voice.

I would recommend that you continue the doxycycline until completed. You were 

given Decadron (a steroid medication and an anti-inflammatory medication) in the

emergency department to help with your hoarse voice. It may also help decrease 

some of the inflammation in your lungs as well. This medication can cause your 

blood sugars to elevate. You should check your lunch sugars frequently. You 

should find your albuterol inhaler at home and use it with the spacer that I 

provided you. You can take 2 puffs every 4 hours as needed for wheezing, cough 

and shortness of breath. You should use a cool mist humidifier at home and if 

you have. I recommend that you stop smoking. Increase your fluid intake. No work

until 2021. Back to the emergency department for unrelenting vomiting, 

worse breathing or any other concerning signs or symptoms.





Sepsis Event Note (ED)





- Evaluation


Sepsis Screening Result: No Definite Risk





- Focused Exam


Vital Signs: 


                                   Vital Signs











  Temp Pulse Resp BP Pulse Ox


 


 21 04:10  36.8 C  108 H  20  148/92 H  99














- My Orders


Last 24 Hours: 


My Active Orders





21 05:10


RT Aerosol Therapy [RC] ASDIRECTED 














- Assessment/Plan


Last 24 Hours: 


My Active Orders





21 05:10


RT Aerosol Therapy [RC] ASDIRECTED

## 2022-09-17 ENCOUNTER — HOSPITAL ENCOUNTER (EMERGENCY)
Dept: HOSPITAL 7 - FB.ED | Age: 37
Discharge: HOME | End: 2022-09-17
Payer: COMMERCIAL

## 2022-09-17 VITALS — HEART RATE: 117 BPM | DIASTOLIC BLOOD PRESSURE: 87 MMHG | SYSTOLIC BLOOD PRESSURE: 147 MMHG

## 2022-09-17 DIAGNOSIS — N39.0: Primary | ICD-10-CM

## 2023-07-11 ENCOUNTER — HOSPITAL ENCOUNTER (EMERGENCY)
Dept: HOSPITAL 7 - FB.ED | Age: 38
Discharge: HOME | End: 2023-07-11
Payer: COMMERCIAL

## 2023-07-11 VITALS — SYSTOLIC BLOOD PRESSURE: 126 MMHG | HEART RATE: 96 BPM | DIASTOLIC BLOOD PRESSURE: 76 MMHG

## 2023-07-11 DIAGNOSIS — Z79.899: ICD-10-CM

## 2023-07-11 DIAGNOSIS — S83.422A: Primary | ICD-10-CM

## 2023-07-11 DIAGNOSIS — M21.062: ICD-10-CM

## 2023-07-11 DIAGNOSIS — Z86.16: ICD-10-CM

## 2023-07-11 DIAGNOSIS — Z88.6: ICD-10-CM

## 2023-07-11 DIAGNOSIS — E66.9: ICD-10-CM

## 2023-07-11 DIAGNOSIS — E11.9: ICD-10-CM

## 2023-07-11 DIAGNOSIS — M22.2X2: ICD-10-CM

## 2023-07-11 DIAGNOSIS — Z79.4: ICD-10-CM

## 2023-07-11 DIAGNOSIS — Z91.040: ICD-10-CM

## 2023-07-11 DIAGNOSIS — Z88.0: ICD-10-CM
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
## 2023-07-11 PROCEDURE — 99283 EMERGENCY DEPT VISIT LOW MDM: CPT

## 2023-07-11 PROCEDURE — 96372 THER/PROPH/DIAG INJ SC/IM: CPT

## 2025-04-30 ENCOUNTER — HOSPITAL ENCOUNTER (EMERGENCY)
Dept: HOSPITAL 7 - FB.ED | Age: 40
Discharge: HOME | End: 2025-04-30
Payer: COMMERCIAL

## 2025-04-30 VITALS — DIASTOLIC BLOOD PRESSURE: 84 MMHG | SYSTOLIC BLOOD PRESSURE: 163 MMHG | HEART RATE: 110 BPM

## 2025-04-30 DIAGNOSIS — E66.9: ICD-10-CM

## 2025-04-30 DIAGNOSIS — Z79.899: ICD-10-CM

## 2025-04-30 DIAGNOSIS — Z79.84: ICD-10-CM

## 2025-04-30 DIAGNOSIS — E11.9: ICD-10-CM

## 2025-04-30 DIAGNOSIS — Z86.16: ICD-10-CM

## 2025-04-30 DIAGNOSIS — Z88.8: ICD-10-CM

## 2025-04-30 DIAGNOSIS — Z91.040: ICD-10-CM

## 2025-04-30 DIAGNOSIS — S50.01XA: Primary | ICD-10-CM

## 2025-04-30 DIAGNOSIS — Z79.4: ICD-10-CM

## 2025-04-30 DIAGNOSIS — Z88.0: ICD-10-CM
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
## 2025-04-30 DIAGNOSIS — Z90.49: ICD-10-CM
